# Patient Record
Sex: FEMALE | Race: WHITE | Employment: UNEMPLOYED | URBAN - NONMETROPOLITAN AREA
[De-identification: names, ages, dates, MRNs, and addresses within clinical notes are randomized per-mention and may not be internally consistent; named-entity substitution may affect disease eponyms.]

---

## 2017-08-22 ENCOUNTER — HOSPITAL ENCOUNTER (OUTPATIENT)
Age: 32
Discharge: HOME | End: 2017-08-22
Payer: COMMERCIAL

## 2017-08-22 LAB
BASOPHILS # BLD: 0.2 %
BASOPHILS ABSOLUTE: 0 THOU/MM3 (ref 0–0.1)
EOSINOPHIL # BLD: 1.9 %
EOSINOPHILS ABSOLUTE: 0.1 THOU/MM3 (ref 0–0.4)
HCT VFR BLD CALC: 41.6 % (ref 37–47)
HEMOGLOBIN: 14.1 GM/DL (ref 12–16)
LYMPHOCYTES # BLD: 36.2 %
LYMPHOCYTES ABSOLUTE: 2.8 THOU/MM3 (ref 1–4.8)
MCH RBC QN AUTO: 30.7 PG (ref 27–31)
MCHC RBC AUTO-ENTMCNC: 34 GM/DL (ref 33–37)
MCV RBC AUTO: 90.3 FL (ref 81–99)
MONOCYTES # BLD: 10 %
MONOCYTES ABSOLUTE: 0.8 THOU/MM3 (ref 0.4–1.3)
NUCLEATED RED BLOOD CELLS: 0 /100 WBC
PDW BLD-RTO: 13.1 % (ref 11.5–14.5)
PLATELET # BLD: 285 THOU/MM3 (ref 130–400)
PMV BLD AUTO: 7.6 MCM (ref 7.4–10.4)
RBC # BLD: 4.6 MILL/MM3 (ref 4.2–5.4)
RBC # BLD: NORMAL 10*6/UL
SEG NEUTROPHILS: 51.7 %
SEGMENTED NEUTROPHILS ABSOLUTE COUNT: 4 THOU/MM3 (ref 1.8–7.7)
WBC # BLD: 7.8 THOU/MM3 (ref 4.8–10.8)

## 2017-08-22 PROCEDURE — 36415 COLL VENOUS BLD VENIPUNCTURE: CPT

## 2017-08-22 PROCEDURE — 85025 COMPLETE CBC W/AUTO DIFF WBC: CPT

## 2017-10-09 NOTE — PROGRESS NOTES
NPO after midnight  [employer] and drivers license  Wear comfortable clean clothing  Do not bring jewelry or valuables  Shower night before and morning of surgery with a liquid antibacterial soap  Bring list of medications with dosage and how often taken  Follow all instructions given by your physician   needed at discharge

## 2017-10-10 ENCOUNTER — HOSPITAL ENCOUNTER (OUTPATIENT)
Age: 32
Setting detail: OUTPATIENT SURGERY
Discharge: HOME | End: 2017-10-10
Attending: OBSTETRICS & GYNECOLOGY | Admitting: OBSTETRICS & GYNECOLOGY
Payer: COMMERCIAL

## 2017-10-10 ENCOUNTER — ANESTHESIA (OUTPATIENT)
Dept: OPERATING ROOM | Age: 32
End: 2017-10-10
Payer: COMMERCIAL

## 2017-10-10 ENCOUNTER — ANESTHESIA EVENT (OUTPATIENT)
Dept: OPERATING ROOM | Age: 32
End: 2017-10-10
Payer: COMMERCIAL

## 2017-10-10 VITALS
SYSTOLIC BLOOD PRESSURE: 93 MMHG | DIASTOLIC BLOOD PRESSURE: 56 MMHG | HEART RATE: 82 BPM | HEIGHT: 60 IN | WEIGHT: 134 LBS | TEMPERATURE: 97.1 F | BODY MASS INDEX: 26.31 KG/M2 | OXYGEN SATURATION: 100 % | RESPIRATION RATE: 18 BRPM

## 2017-10-10 VITALS — TEMPERATURE: 98.6 F | SYSTOLIC BLOOD PRESSURE: 98 MMHG | DIASTOLIC BLOOD PRESSURE: 54 MMHG | OXYGEN SATURATION: 99 %

## 2017-10-10 PROBLEM — N80.9 ENDOMETRIOSIS DETERMINED BY LAPAROSCOPY: Status: ACTIVE | Noted: 2017-10-10

## 2017-10-10 PROBLEM — N73.6 PELVIC PERITONEAL ADHESIONS, FEMALE: Status: ACTIVE | Noted: 2017-10-10

## 2017-10-10 PROCEDURE — 2580000003 HC RX 258: Performed by: OBSTETRICS & GYNECOLOGY

## 2017-10-10 PROCEDURE — 2500000003 HC RX 250 WO HCPCS: Performed by: ANESTHESIOLOGY

## 2017-10-10 PROCEDURE — 3700000000 HC ANESTHESIA ATTENDED CARE: Performed by: OBSTETRICS & GYNECOLOGY

## 2017-10-10 PROCEDURE — 2500000003 HC RX 250 WO HCPCS: Performed by: NURSE ANESTHETIST, CERTIFIED REGISTERED

## 2017-10-10 PROCEDURE — 7100000001 HC PACU RECOVERY - ADDTL 15 MIN: Performed by: OBSTETRICS & GYNECOLOGY

## 2017-10-10 PROCEDURE — 7100000000 HC PACU RECOVERY - FIRST 15 MIN: Performed by: OBSTETRICS & GYNECOLOGY

## 2017-10-10 PROCEDURE — 6370000000 HC RX 637 (ALT 250 FOR IP): Performed by: OBSTETRICS & GYNECOLOGY

## 2017-10-10 PROCEDURE — 2720000010 HC SURG SUPPLY STERILE: Performed by: OBSTETRICS & GYNECOLOGY

## 2017-10-10 PROCEDURE — 3700000001 HC ADD 15 MINUTES (ANESTHESIA): Performed by: OBSTETRICS & GYNECOLOGY

## 2017-10-10 PROCEDURE — 7100000010 HC PHASE II RECOVERY - FIRST 15 MIN: Performed by: OBSTETRICS & GYNECOLOGY

## 2017-10-10 PROCEDURE — 6360000002 HC RX W HCPCS: Performed by: OBSTETRICS & GYNECOLOGY

## 2017-10-10 PROCEDURE — 2500000003 HC RX 250 WO HCPCS: Performed by: OBSTETRICS & GYNECOLOGY

## 2017-10-10 PROCEDURE — 2580000003 HC RX 258: Performed by: NURSE ANESTHETIST, CERTIFIED REGISTERED

## 2017-10-10 PROCEDURE — 3600000013 HC SURGERY LEVEL 3 ADDTL 15MIN: Performed by: OBSTETRICS & GYNECOLOGY

## 2017-10-10 PROCEDURE — C1758 CATHETER, URETERAL: HCPCS | Performed by: OBSTETRICS & GYNECOLOGY

## 2017-10-10 PROCEDURE — 7100000011 HC PHASE II RECOVERY - ADDTL 15 MIN: Performed by: OBSTETRICS & GYNECOLOGY

## 2017-10-10 PROCEDURE — 3600000003 HC SURGERY LEVEL 3 BASE: Performed by: OBSTETRICS & GYNECOLOGY

## 2017-10-10 PROCEDURE — 6360000002 HC RX W HCPCS: Performed by: NURSE ANESTHETIST, CERTIFIED REGISTERED

## 2017-10-10 RX ORDER — OXYCODONE HYDROCHLORIDE AND ACETAMINOPHEN 5; 325 MG/1; MG/1
2 TABLET ORAL EVERY 4 HOURS PRN
Status: DISCONTINUED | OUTPATIENT
Start: 2017-10-10 | End: 2017-10-10 | Stop reason: HOSPADM

## 2017-10-10 RX ORDER — KETOROLAC TROMETHAMINE 30 MG/ML
30 INJECTION, SOLUTION INTRAMUSCULAR; INTRAVENOUS EVERY 6 HOURS PRN
Status: DISCONTINUED | OUTPATIENT
Start: 2017-10-10 | End: 2017-10-10 | Stop reason: HOSPADM

## 2017-10-10 RX ORDER — SODIUM CHLORIDE 9 MG/ML
INJECTION, SOLUTION INTRAVENOUS CONTINUOUS
Status: DISCONTINUED | OUTPATIENT
Start: 2017-10-10 | End: 2017-10-10 | Stop reason: HOSPADM

## 2017-10-10 RX ORDER — PROPOFOL 10 MG/ML
INJECTION, EMULSION INTRAVENOUS PRN
Status: DISCONTINUED | OUTPATIENT
Start: 2017-10-10 | End: 2017-10-10 | Stop reason: SDUPTHER

## 2017-10-10 RX ORDER — MORPHINE SULFATE 2 MG/ML
2 INJECTION, SOLUTION INTRAMUSCULAR; INTRAVENOUS EVERY 5 MIN PRN
Status: DISCONTINUED | OUTPATIENT
Start: 2017-10-10 | End: 2017-10-10 | Stop reason: HOSPADM

## 2017-10-10 RX ORDER — FENTANYL CITRATE 50 UG/ML
50 INJECTION, SOLUTION INTRAMUSCULAR; INTRAVENOUS EVERY 5 MIN PRN
Status: DISCONTINUED | OUTPATIENT
Start: 2017-10-10 | End: 2017-10-10 | Stop reason: HOSPADM

## 2017-10-10 RX ORDER — SODIUM CHLORIDE 0.9 % (FLUSH) 0.9 %
10 SYRINGE (ML) INJECTION PRN
Status: DISCONTINUED | OUTPATIENT
Start: 2017-10-10 | End: 2017-10-10 | Stop reason: HOSPADM

## 2017-10-10 RX ORDER — SODIUM CHLORIDE 0.9 % (FLUSH) 0.9 %
10 SYRINGE (ML) INJECTION EVERY 12 HOURS SCHEDULED
Status: DISCONTINUED | OUTPATIENT
Start: 2017-10-10 | End: 2017-10-10 | Stop reason: HOSPADM

## 2017-10-10 RX ORDER — BUPIVACAINE HYDROCHLORIDE 5 MG/ML
INJECTION, SOLUTION PERINEURAL PRN
Status: DISCONTINUED | OUTPATIENT
Start: 2017-10-10 | End: 2017-10-10 | Stop reason: HOSPADM

## 2017-10-10 RX ORDER — GLYCOPYRROLATE 0.2 MG/ML
0.2 INJECTION INTRAMUSCULAR; INTRAVENOUS ONCE
Status: COMPLETED | OUTPATIENT
Start: 2017-10-10 | End: 2017-10-10

## 2017-10-10 RX ORDER — ONDANSETRON 2 MG/ML
4 INJECTION INTRAMUSCULAR; INTRAVENOUS EVERY 6 HOURS PRN
Status: DISCONTINUED | OUTPATIENT
Start: 2017-10-10 | End: 2017-10-10 | Stop reason: HOSPADM

## 2017-10-10 RX ORDER — ACETAMINOPHEN 325 MG/1
650 TABLET ORAL EVERY 4 HOURS PRN
Status: DISCONTINUED | OUTPATIENT
Start: 2017-10-10 | End: 2017-10-10 | Stop reason: HOSPADM

## 2017-10-10 RX ORDER — DOCUSATE SODIUM 100 MG/1
100 CAPSULE, LIQUID FILLED ORAL 2 TIMES DAILY
Status: DISCONTINUED | OUTPATIENT
Start: 2017-10-10 | End: 2017-10-10 | Stop reason: HOSPADM

## 2017-10-10 RX ORDER — LIDOCAINE HYDROCHLORIDE 20 MG/ML
INJECTION, SOLUTION INFILTRATION; PERINEURAL PRN
Status: DISCONTINUED | OUTPATIENT
Start: 2017-10-10 | End: 2017-10-10 | Stop reason: SDUPTHER

## 2017-10-10 RX ORDER — LABETALOL HYDROCHLORIDE 5 MG/ML
10 INJECTION, SOLUTION INTRAVENOUS EVERY 10 MIN PRN
Status: DISCONTINUED | OUTPATIENT
Start: 2017-10-10 | End: 2017-10-10 | Stop reason: HOSPADM

## 2017-10-10 RX ORDER — OXYCODONE HYDROCHLORIDE AND ACETAMINOPHEN 5; 325 MG/1; MG/1
1 TABLET ORAL EVERY 4 HOURS PRN
Status: DISCONTINUED | OUTPATIENT
Start: 2017-10-10 | End: 2017-10-10 | Stop reason: HOSPADM

## 2017-10-10 RX ORDER — DEXAMETHASONE SODIUM PHOSPHATE 4 MG/ML
INJECTION, SOLUTION INTRA-ARTICULAR; INTRALESIONAL; INTRAMUSCULAR; INTRAVENOUS; SOFT TISSUE PRN
Status: DISCONTINUED | OUTPATIENT
Start: 2017-10-10 | End: 2017-10-10 | Stop reason: SDUPTHER

## 2017-10-10 RX ORDER — NEOSTIGMINE METHYLSULFATE 1 MG/ML
INJECTION, SOLUTION INTRAVENOUS PRN
Status: DISCONTINUED | OUTPATIENT
Start: 2017-10-10 | End: 2017-10-10 | Stop reason: SDUPTHER

## 2017-10-10 RX ORDER — OXYCODONE HYDROCHLORIDE AND ACETAMINOPHEN 5; 325 MG/1; MG/1
1-2 TABLET ORAL EVERY 4 HOURS PRN
Qty: 28 TABLET | Refills: 0 | Status: SHIPPED | OUTPATIENT
Start: 2017-10-10 | End: 2017-10-17

## 2017-10-10 RX ORDER — GLYCOPYRROLATE 0.2 MG/ML
INJECTION INTRAMUSCULAR; INTRAVENOUS PRN
Status: DISCONTINUED | OUTPATIENT
Start: 2017-10-10 | End: 2017-10-10 | Stop reason: SDUPTHER

## 2017-10-10 RX ORDER — KETOROLAC TROMETHAMINE 30 MG/ML
INJECTION, SOLUTION INTRAMUSCULAR; INTRAVENOUS PRN
Status: DISCONTINUED | OUTPATIENT
Start: 2017-10-10 | End: 2017-10-10 | Stop reason: SDUPTHER

## 2017-10-10 RX ORDER — FENTANYL CITRATE 50 UG/ML
INJECTION, SOLUTION INTRAMUSCULAR; INTRAVENOUS PRN
Status: DISCONTINUED | OUTPATIENT
Start: 2017-10-10 | End: 2017-10-10 | Stop reason: SDUPTHER

## 2017-10-10 RX ORDER — ONDANSETRON 2 MG/ML
INJECTION INTRAMUSCULAR; INTRAVENOUS PRN
Status: DISCONTINUED | OUTPATIENT
Start: 2017-10-10 | End: 2017-10-10 | Stop reason: SDUPTHER

## 2017-10-10 RX ORDER — SODIUM CHLORIDE 9 MG/ML
INJECTION, SOLUTION INTRAVENOUS CONTINUOUS PRN
Status: DISCONTINUED | OUTPATIENT
Start: 2017-10-10 | End: 2017-10-10 | Stop reason: SDUPTHER

## 2017-10-10 RX ORDER — ROCURONIUM BROMIDE 10 MG/ML
INJECTION, SOLUTION INTRAVENOUS PRN
Status: DISCONTINUED | OUTPATIENT
Start: 2017-10-10 | End: 2017-10-10 | Stop reason: SDUPTHER

## 2017-10-10 RX ADMIN — LIDOCAINE HYDROCHLORIDE 60 MG: 20 INJECTION, SOLUTION INFILTRATION; PERINEURAL at 09:34

## 2017-10-10 RX ADMIN — FENTANYL CITRATE 100 MCG: 50 INJECTION INTRAMUSCULAR; INTRAVENOUS at 09:34

## 2017-10-10 RX ADMIN — ONDANSETRON 4 MG: 2 INJECTION INTRAMUSCULAR; INTRAVENOUS at 09:37

## 2017-10-10 RX ADMIN — GLYCOPYRROLATE 0.2 MG: 0.2 INJECTION, SOLUTION INTRAMUSCULAR; INTRAVENOUS at 10:40

## 2017-10-10 RX ADMIN — SODIUM CHLORIDE: 9 INJECTION, SOLUTION INTRAVENOUS at 10:37

## 2017-10-10 RX ADMIN — DEXAMETHASONE SODIUM PHOSPHATE 8 MG: 4 INJECTION, SOLUTION INTRAMUSCULAR; INTRAVENOUS at 09:37

## 2017-10-10 RX ADMIN — OXYCODONE HYDROCHLORIDE AND ACETAMINOPHEN 2 TABLET: 5; 325 TABLET ORAL at 11:11

## 2017-10-10 RX ADMIN — KETOROLAC TROMETHAMINE 30 MG: 30 INJECTION, SOLUTION INTRAMUSCULAR; INTRAVENOUS at 10:01

## 2017-10-10 RX ADMIN — Medication 30 MG: at 09:34

## 2017-10-10 RX ADMIN — ONDANSETRON 4 MG: 2 INJECTION INTRAMUSCULAR; INTRAVENOUS at 10:14

## 2017-10-10 RX ADMIN — SODIUM CHLORIDE: 9 INJECTION, SOLUTION INTRAVENOUS at 09:32

## 2017-10-10 RX ADMIN — PROPOFOL 150 MG: 10 INJECTION, EMULSION INTRAVENOUS at 09:34

## 2017-10-10 RX ADMIN — NEOSTIGMINE METHYLSULFATE 3 MG: 1 INJECTION, SOLUTION INTRAVENOUS at 10:01

## 2017-10-10 RX ADMIN — GLYCOPYRROLATE 0.4 MG: 0.2 INJECTION, SOLUTION INTRAMUSCULAR; INTRAVENOUS at 10:01

## 2017-10-10 ASSESSMENT — PAIN SCALES - GENERAL
PAINLEVEL_OUTOF10: 7
PAINLEVEL_OUTOF10: 7

## 2017-10-10 ASSESSMENT — PULMONARY FUNCTION TESTS
PIF_VALUE: 13
PIF_VALUE: 5
PIF_VALUE: 16
PIF_VALUE: 13
PIF_VALUE: 17
PIF_VALUE: 1
PIF_VALUE: 14
PIF_VALUE: 22
PIF_VALUE: 14
PIF_VALUE: 23
PIF_VALUE: 16
PIF_VALUE: 23
PIF_VALUE: 23
PIF_VALUE: 13
PIF_VALUE: 1
PIF_VALUE: 22
PIF_VALUE: 21
PIF_VALUE: 13
PIF_VALUE: 14
PIF_VALUE: 13
PIF_VALUE: 13
PIF_VALUE: 23
PIF_VALUE: 14
PIF_VALUE: 16
PIF_VALUE: 23
PIF_VALUE: 21
PIF_VALUE: 13
PIF_VALUE: 24
PIF_VALUE: 14
PIF_VALUE: 22
PIF_VALUE: 13

## 2017-10-10 ASSESSMENT — PAIN DESCRIPTION - LOCATION: LOCATION: ABDOMEN

## 2017-10-10 ASSESSMENT — PAIN DESCRIPTION - ORIENTATION: ORIENTATION: MID

## 2017-10-10 ASSESSMENT — PAIN DESCRIPTION - PAIN TYPE: TYPE: SURGICAL PAIN

## 2017-10-10 ASSESSMENT — PAIN - FUNCTIONAL ASSESSMENT: PAIN_FUNCTIONAL_ASSESSMENT: 0-10

## 2017-10-10 NOTE — PROGRESS NOTES
APPROXIMATELY 75ML OF CLEAR, YELLOW URINE DRAINED FROM THE BLADDER WITH A 14FR RED RUBBER STRAIGHT CATHETER BY Garima Saldana RN

## 2017-10-10 NOTE — ANESTHESIA PRE PROCEDURE
BP Readings from Last 3 Encounters:   10/10/17 109/77   03/06/17 135/78   03/08/16 100/56       NPO Status: Time of last liquid consumption: 1930                        Time of last solid consumption: 1930                        Date of last liquid consumption: 10/09/17                        Date of last solid food consumption: 10/09/17    BMI:   Wt Readings from Last 3 Encounters:   10/10/17 134 lb (60.8 kg)   03/06/17 133 lb (60.3 kg)   03/08/16 135 lb (61.2 kg)     Body mass index is 26.17 kg/m². CBC:   Lab Results   Component Value Date    WBC 7.8 08/22/2017    RBC 4.60 08/22/2017    RBC 4.40 12/30/2011    HGB 14.1 08/22/2017    HCT 41.6 08/22/2017    MCV 90.3 08/22/2017    RDW 13.1 08/22/2017     08/22/2017       CMP:   Lab Results   Component Value Date     03/06/2017    K 3.8 03/06/2017     03/06/2017    CO2 22 03/06/2017    BUN 10 03/06/2017    CREATININE 0.7 03/06/2017    LABGLOM >90 03/06/2017    GLUCOSE 91 03/06/2017    PROT 8.1 12/25/2014    CALCIUM 9.4 03/06/2017    BILITOT 0.2 12/25/2014    ALKPHOS 61 12/25/2014    AST 16 12/25/2014    ALT 11 12/25/2014       POC Tests: No results for input(s): POCGLU, POCNA, POCK, POCCL, POCBUN, POCHEMO, POCHCT in the last 72 hours.     Coags: No results found for: PROTIME, INR, APTT    HCG (If Applicable):   Lab Results   Component Value Date    PREGTESTUR NEGATIVE 03/06/2017        ABGs: No results found for: PHART, PO2ART, ZSF8CLI, NBZ1RIU, BEART, T9QGPYUH     Type & Screen (If Applicable):  No results found for: LABABO, LABRH    Anesthesia Evaluation    Airway: Mallampati: II  TM distance: >3 FB   Neck ROM: full  Mouth opening: > = 3 FB Dental:          Pulmonary:   (+) decreased breath sounds,  asthma:                            Cardiovascular:            Rhythm: regular                      Neuro/Psych:               GI/Hepatic/Renal:             Endo/Other:                     Abdominal:           Vascular:

## 2017-10-10 NOTE — PROGRESS NOTES
1008: Patient to recovery room via cart. Patient is arouses easily to name. Patient placed on monitor and vitals obtained, see charting. Report received from [facility] and [employer]. Band aids and devi pad is in place, see LDA. Patient is on room air. Patient stating she is nauseous at this time. 1014: PRN Zofran given, see MAR. Ice pack applied to patient's abdomen. 1020: Patient stating she is having pain in her abdomen. Due to the patient's continued low blood pressure and lower heart rate I do not feel comfortable medicating the patient at this time. 1026: Patient stating her nausea is better. Blood pressure continues to be low, fluids are infusing. 1038: Dr. Kendra Melton notified of patient's heart rate maintaining in the mid-lower 40's, see order. 1040: 0.2mg of Robinul given, see MAR.   1046: Patient's vital signs are stable, see charting. 1055: Patient rating pain a \"7\"/10 and denying nausea. I told patient once she gets something to eat I would get her some pain pills, she verbalized understanding. Vital signs are stable, see charting. 1100: Patient meets criteria to be discharged from phase 1 to phase 2. Patient moved to phase 2 at this time. 1104: Patient given offered snack and drink. Bed is in the lowest position, call light is within reach and patient's  is at the bedside. 1111: Patient given PRN pain medication for a pain level of \"7\"/10 and denying nausea, see MAR.  remains at the bedside. 1131: Patient rating pain a \"2\"/10 and denying nausea. Vital signs rechecked, see charting. Fluids hooked back up due to a lower blood pressure. Patient denying feeling dizzy. 1139: Vitals rechecked, see charting. 1141: IV removed at this time, no complications. 1145: Discharge instructions given and explained to the patient and the patient's , both verbalized understanding. 1155: Patient discharged home in stable condition with her .

## 2017-10-10 NOTE — ANESTHESIA POSTPROCEDURE EVALUATION
Department of Anesthesiology  Postprocedure Note    Patient: Erin Lester  MRN: [id number]  YOB: 1985  Date of evaluation: 10/10/2017  Time:  11:19 AM     Procedure Summary     Date:  10/10/17 Room / Location:  [facility] 02 / [address]    Anesthesia Start:  0932 Anesthesia Stop:  1010    Procedure:  DIAGNOSTIC LAPAROSCOPY WITH CAUTERY OF ENDOMETRIOSIS AND LYSIS OF ADHESIONS (N/A ) Diagnosis:  (PELVIC PAIN)    Surgeon:  Angela Fajardo MD Responsible Provider:  Harshad Ozuna MD    Anesthesia Type:  general ASA Status:  Not recorded          Anesthesia Type: general    Scottie Phase I: Scottie Score: 9    Scottie Phase II: Scottie Score: 10    Last vitals: Reviewed and per EMR flowsheets.        Anesthesia Post Evaluation    Patient location during evaluation: PACU  Patient participation: complete - patient participated  Level of consciousness: awake  Pain score: 4  Airway patency: patent  Nausea & Vomiting: no vomiting and no nausea  Complications: no  Cardiovascular status: hemodynamically stable  Respiratory status: acceptable  Hydration status: stable

## 2018-02-01 ENCOUNTER — HOSPITAL ENCOUNTER (EMERGENCY)
Age: 33
Discharge: HOME | End: 2018-02-01
Payer: COMMERCIAL

## 2018-02-01 VITALS
WEIGHT: 135 LBS | TEMPERATURE: 99 F | DIASTOLIC BLOOD PRESSURE: 69 MMHG | OXYGEN SATURATION: 98 % | HEART RATE: 79 BPM | BODY MASS INDEX: 26.37 KG/M2 | RESPIRATION RATE: 18 BRPM | SYSTOLIC BLOOD PRESSURE: 119 MMHG

## 2018-02-01 DIAGNOSIS — J02.9 PHARYNGITIS, UNSPECIFIED ETIOLOGY: Primary | ICD-10-CM

## 2018-02-01 LAB
GROUP A STREP CULTURE, REFLEX: NEGATIVE
REFLEX THROAT C + S: NORMAL

## 2018-02-01 PROCEDURE — 87070 CULTURE OTHR SPECIMN AEROBIC: CPT

## 2018-02-01 PROCEDURE — 87880 STREP A ASSAY W/OPTIC: CPT

## 2018-02-01 PROCEDURE — 99214 OFFICE O/P EST MOD 30 MIN: CPT

## 2018-02-01 PROCEDURE — 99213 OFFICE O/P EST LOW 20 MIN: CPT | Performed by: NURSE PRACTITIONER

## 2018-02-01 RX ORDER — BROMPHENIRAMINE MALEATE, PSEUDOEPHEDRINE HYDROCHLORIDE, AND DEXTROMETHORPHAN HYDROBROMIDE 2; 30; 10 MG/5ML; MG/5ML; MG/5ML
5 SYRUP ORAL 4 TIMES DAILY PRN
Qty: 1 BOTTLE | Refills: 0 | Status: SHIPPED | OUTPATIENT
Start: 2018-02-01 | End: 2018-06-01 | Stop reason: ALTCHOICE

## 2018-02-01 ASSESSMENT — ENCOUNTER SYMPTOMS
SINUS PAIN: 0
WHEEZING: 0
VOMITING: 0
DIARRHEA: 0
CHEST TIGHTNESS: 0
SORE THROAT: 1
SINUS PRESSURE: 0
EYE ITCHING: 0
RHINORRHEA: 0
COUGH: 0
TROUBLE SWALLOWING: 0
NAUSEA: 0
SHORTNESS OF BREATH: 0
EYE DISCHARGE: 0
COLOR CHANGE: 0

## 2018-02-01 ASSESSMENT — PAIN DESCRIPTION - PAIN TYPE: TYPE: ACUTE PAIN

## 2018-02-01 ASSESSMENT — PAIN DESCRIPTION - LOCATION: LOCATION: THROAT

## 2018-02-01 ASSESSMENT — PAIN SCALES - GENERAL: PAINLEVEL_OUTOF10: 4

## 2018-02-01 ASSESSMENT — PAIN DESCRIPTION - DESCRIPTORS: DESCRIPTORS: ACHING

## 2018-02-01 NOTE — PROGRESS NOTES
All discharge education and information given. Pt instructed to go to ED for any shortness of breath, chest pain or Abd pain. Verbalized Understanding. Left stable.

## 2018-02-01 NOTE — ED TRIAGE NOTES
ambulatory back to exam room. Patient complains of sore throat. Symptoms began 2 days ago. Respirations easy and unlabored. Condition stable. Waiting in room to be seen by medical provider.

## 2018-02-03 LAB — THROAT/NOSE CULTURE: NORMAL

## 2018-06-01 ENCOUNTER — HOSPITAL ENCOUNTER (EMERGENCY)
Age: 33
Discharge: HOME | End: 2018-06-01
Payer: COMMERCIAL

## 2018-06-01 VITALS
TEMPERATURE: 98.4 F | DIASTOLIC BLOOD PRESSURE: 62 MMHG | HEART RATE: 70 BPM | WEIGHT: 138 LBS | RESPIRATION RATE: 16 BRPM | OXYGEN SATURATION: 98 % | SYSTOLIC BLOOD PRESSURE: 103 MMHG | BODY MASS INDEX: 26.95 KG/M2

## 2018-06-01 DIAGNOSIS — J40 BRONCHITIS: Primary | ICD-10-CM

## 2018-06-01 PROCEDURE — 99213 OFFICE O/P EST LOW 20 MIN: CPT

## 2018-06-01 PROCEDURE — 99213 OFFICE O/P EST LOW 20 MIN: CPT | Performed by: NURSE PRACTITIONER

## 2018-06-01 RX ORDER — GUAIFENESIN AND CODEINE PHOSPHATE 100; 10 MG/5ML; MG/5ML
5 SOLUTION ORAL 3 TIMES DAILY PRN
Qty: 100 ML | Refills: 0 | Status: SHIPPED | OUTPATIENT
Start: 2018-06-01 | End: 2018-06-08

## 2018-06-01 RX ORDER — BENZONATATE 100 MG/1
100 CAPSULE ORAL 3 TIMES DAILY PRN
Qty: 15 CAPSULE | Refills: 0 | Status: SHIPPED | OUTPATIENT
Start: 2018-06-01 | End: 2018-06-06

## 2018-06-01 RX ORDER — AZITHROMYCIN 250 MG/1
TABLET, FILM COATED ORAL
Qty: 6 TABLET | Refills: 0 | Status: SHIPPED | OUTPATIENT
Start: 2018-06-01 | End: 2019-01-05

## 2018-06-01 RX ORDER — GUAIFENESIN 100 MG/5ML
200 SOLUTION ORAL EVERY 4 HOURS PRN
COMMUNITY
End: 2018-06-01

## 2018-06-01 ASSESSMENT — ENCOUNTER SYMPTOMS
SHORTNESS OF BREATH: 0
SINUS PRESSURE: 0
CHEST TIGHTNESS: 0
NAUSEA: 0
SORE THROAT: 0
ABDOMINAL PAIN: 0
COUGH: 1
SINUS PAIN: 0
RHINORRHEA: 1
VOMITING: 0
DIARRHEA: 0

## 2018-07-27 ENCOUNTER — OFFICE VISIT (OUTPATIENT)
Dept: FAMILY MEDICINE CLINIC | Age: 33
End: 2018-07-27
Payer: COMMERCIAL

## 2018-07-27 VITALS
HEART RATE: 68 BPM | OXYGEN SATURATION: 99 % | WEIGHT: 134.4 LBS | HEIGHT: 63 IN | BODY MASS INDEX: 23.81 KG/M2 | DIASTOLIC BLOOD PRESSURE: 62 MMHG | SYSTOLIC BLOOD PRESSURE: 102 MMHG | RESPIRATION RATE: 18 BRPM

## 2018-07-27 DIAGNOSIS — E16.1 REACTIVE HYPOGLYCEMIA: Primary | ICD-10-CM

## 2018-07-27 DIAGNOSIS — Z72.0 TOBACCO ABUSE: ICD-10-CM

## 2018-07-27 DIAGNOSIS — J30.1 CHRONIC SEASONAL ALLERGIC RHINITIS DUE TO POLLEN: ICD-10-CM

## 2018-07-27 PROCEDURE — G8427 DOCREV CUR MEDS BY ELIG CLIN: HCPCS | Performed by: NURSE PRACTITIONER

## 2018-07-27 PROCEDURE — G8420 CALC BMI NORM PARAMETERS: HCPCS | Performed by: NURSE PRACTITIONER

## 2018-07-27 PROCEDURE — 4004F PT TOBACCO SCREEN RCVD TLK: CPT | Performed by: NURSE PRACTITIONER

## 2018-07-27 PROCEDURE — 99204 OFFICE O/P NEW MOD 45 MIN: CPT | Performed by: NURSE PRACTITIONER

## 2018-07-27 RX ORDER — CETIRIZINE HYDROCHLORIDE 10 MG/1
10 TABLET ORAL DAILY
Qty: 30 TABLET | Refills: 5 | Status: SHIPPED | OUTPATIENT
Start: 2018-07-27 | End: 2019-01-05

## 2018-07-27 RX ORDER — VARENICLINE TARTRATE 25 MG
KIT ORAL
Qty: 1 EACH | Refills: 0 | Status: SHIPPED | OUTPATIENT
Start: 2018-07-27 | End: 2019-01-05

## 2018-07-27 RX ORDER — VARENICLINE TARTRATE 1 MG/1
1 TABLET, FILM COATED ORAL 2 TIMES DAILY
Qty: 60 TABLET | Refills: 3 | Status: SHIPPED | OUTPATIENT
Start: 2018-07-27 | End: 2019-01-05

## 2018-07-27 RX ORDER — MELATONIN 10 MG
1 CAPSULE ORAL NIGHTLY
Qty: 30 CAPSULE | Refills: 11 | Status: SHIPPED | OUTPATIENT
Start: 2018-07-27 | End: 2020-03-06

## 2018-07-27 ASSESSMENT — ENCOUNTER SYMPTOMS
DIARRHEA: 0
ABDOMINAL PAIN: 0
EYE REDNESS: 0
CONSTIPATION: 0
COLOR CHANGE: 0
COUGH: 1
EYE ITCHING: 0
EYE PAIN: 0
SORE THROAT: 0
SINUS PRESSURE: 0
SHORTNESS OF BREATH: 0
RHINORRHEA: 0
EYE DISCHARGE: 0
TROUBLE SWALLOWING: 0
CHEST TIGHTNESS: 0
SINUS PAIN: 0
BACK PAIN: 0
PHOTOPHOBIA: 0
ABDOMINAL DISTENTION: 1
WHEEZING: 0
NAUSEA: 0
BLOOD IN STOOL: 0
VOMITING: 0

## 2018-07-27 ASSESSMENT — PATIENT HEALTH QUESTIONNAIRE - PHQ9
SUM OF ALL RESPONSES TO PHQ QUESTIONS 1-9: 0
1. LITTLE INTEREST OR PLEASURE IN DOING THINGS: 0
SUM OF ALL RESPONSES TO PHQ9 QUESTIONS 1 & 2: 0
2. FEELING DOWN, DEPRESSED OR HOPELESS: 0

## 2018-07-27 NOTE — PROGRESS NOTES
[facility]  [facility]  [address] [place] [address]  Dept: [phone]  Dept Fax: : [phone]     Visit Date:  7/27/2018      Patient:  José Manuel Mcfarland  YOB: 1985    HPI:     Chief Complaint   Patient presents with    Other     wants to quit smoking       New patient. Wants to quit smoking. Would like chantix. Gets shaky if she doesn't eat every 3 hours. Talked about no unopposed carbs. Medications    Current Outpatient Prescriptions:     varenicline (CHANTIX STARTING MONTH PAK) 0.5 MG X 11 & 1 MG X 42 tablet, Take by mouth., Disp: 1 each, Rfl: 0    varenicline (CHANTIX) 1 MG tablet, Take 1 tablet by mouth 2 times daily, Disp: 60 tablet, Rfl: 3    cetirizine (ZYRTEC ALLERGY) 10 MG tablet, Take 1 tablet by mouth daily, Disp: 30 tablet, Rfl: 5    Melatonin 10 MG CAPS, Take 1 capsule by mouth nightly, Disp: 30 capsule, Rfl: 11    azithromycin (ZITHROMAX) 250 MG tablet, Take 2 tablets by mouth on day one, then take one tablet by mouth on days 2 through 5., Disp: 6 tablet, Rfl: 0    The patient is allergic to pcn [penicillins]. Past Medical History  Carol Grant  has a past medical history of Asthma; Endometriosis; PID (acute pelvic inflammatory disease); and Placenta previa. Subjective:      Review of Systems   Constitutional: Positive for fatigue and unexpected weight change (gained weight since she turned 30). Negative for activity change, appetite change and fever. HENT: Positive for congestion (allergies) and postnasal drip. Negative for dental problem, ear pain, hearing loss, mouth sores, rhinorrhea, sinus pain, sinus pressure, sore throat and trouble swallowing. Eyes: Negative for photophobia, pain, discharge, redness, itching and visual disturbance (blurred vision when she is tired). Respiratory: Positive for cough (smokers cough. smokes less than 1ppd).  Negative for chest tightness, shortness of breath and wheezing. Cardiovascular: Negative for chest pain, palpitations and leg swelling. Gastrointestinal: Positive for abdominal distention (lots of gas). Negative for abdominal pain, blood in stool, constipation, diarrhea, nausea and vomiting. Endocrine: Negative for cold intolerance, heat intolerance, polydipsia, polyphagia and polyuria. Genitourinary: Positive for dysuria. Negative for difficulty urinating, frequency and hematuria. Musculoskeletal: Negative for arthralgias, back pain, gait problem, joint swelling, myalgias, neck pain and neck stiffness. Skin: Negative for color change, pallor, rash and wound. Allergic/Immunologic: Negative for environmental allergies and food allergies. Neurological: Positive for light-headedness (when she gets shaky). Negative for dizziness, tremors, seizures, speech difficulty, weakness, numbness and headaches. Hematological: Negative for adenopathy. Does not bruise/bleed easily. Psychiatric/Behavioral: Positive for sleep disturbance (trouble falling asleep off and on). Negative for behavioral problems, confusion and decreased concentration. The patient is not hyperactive. Objective:     /62   Pulse 68   Resp 18   Ht 5' 2.5\" (1.588 m)   Wt 134 lb 6.4 oz (61 kg)   SpO2 99%   BMI 24.19 kg/m²     Physical Exam   Constitutional: She is oriented to person, place, and time. She appears well-developed and well-nourished. HENT:   Head: Normocephalic and atraumatic. Right Ear: External ear normal.   Left Ear: External ear normal.   Nose: Nose normal.   Mouth/Throat: Oropharynx is clear and moist.   Eyes: Conjunctivae and EOM are normal. Pupils are equal, round, and reactive to light. Neck: Normal range of motion. Neck supple. No tracheal deviation present. No thyromegaly present. Cardiovascular: Normal rate, regular rhythm, normal heart sounds and intact distal pulses. No murmur heard.   Pulmonary/Chest: Effort normal and breath sounds normal.

## 2019-01-05 ENCOUNTER — HOSPITAL ENCOUNTER (EMERGENCY)
Age: 34
Discharge: HOME | End: 2019-01-05
Payer: COMMERCIAL

## 2019-01-05 VITALS
SYSTOLIC BLOOD PRESSURE: 101 MMHG | DIASTOLIC BLOOD PRESSURE: 71 MMHG | BODY MASS INDEX: 24.95 KG/M2 | TEMPERATURE: 98.5 F | HEART RATE: 86 BPM | WEIGHT: 138.6 LBS | OXYGEN SATURATION: 99 % | RESPIRATION RATE: 16 BRPM

## 2019-01-05 DIAGNOSIS — J02.0 ACUTE STREPTOCOCCAL PHARYNGITIS: Primary | ICD-10-CM

## 2019-01-05 PROCEDURE — 99213 OFFICE O/P EST LOW 20 MIN: CPT | Performed by: NURSE PRACTITIONER

## 2019-01-05 PROCEDURE — 99212 OFFICE O/P EST SF 10 MIN: CPT

## 2019-01-05 RX ORDER — AZITHROMYCIN 250 MG/1
TABLET, FILM COATED ORAL
Qty: 6 TABLET | Refills: 0 | Status: SHIPPED | OUTPATIENT
Start: 2019-01-05 | End: 2019-05-20

## 2019-01-05 RX ORDER — SAW/VIT E/SOD SEL/LYC/BETA/PYG 160-100
1 TABLET ORAL DAILY
Qty: 30 CAPSULE | Refills: 0 | Status: SHIPPED | OUTPATIENT
Start: 2019-01-05 | End: 2019-02-04

## 2019-01-05 ASSESSMENT — PAIN DESCRIPTION - LOCATION: LOCATION: THROAT

## 2019-01-05 ASSESSMENT — ENCOUNTER SYMPTOMS
RHINORRHEA: 0
TROUBLE SWALLOWING: 0
EYE DISCHARGE: 0
SHORTNESS OF BREATH: 0
ABDOMINAL PAIN: 1
CHOKING: 0
VOICE CHANGE: 0
STRIDOR: 0
WHEEZING: 0
COUGH: 0
SINUS CONGESTION: 0
APNEA: 0
CHEST TIGHTNESS: 0

## 2019-01-05 ASSESSMENT — PAIN DESCRIPTION - FREQUENCY: FREQUENCY: CONTINUOUS

## 2019-01-05 ASSESSMENT — PAIN DESCRIPTION - DESCRIPTORS: DESCRIPTORS: SORE

## 2019-01-05 ASSESSMENT — PAIN DESCRIPTION - PAIN TYPE: TYPE: ACUTE PAIN

## 2019-01-05 ASSESSMENT — PAIN SCALES - GENERAL: PAINLEVEL_OUTOF10: 3

## 2019-01-07 ENCOUNTER — APPOINTMENT (OUTPATIENT)
Dept: GENERAL RADIOLOGY | Age: 34
End: 2019-01-07
Payer: COMMERCIAL

## 2019-01-07 ENCOUNTER — HOSPITAL ENCOUNTER (EMERGENCY)
Age: 34
Discharge: HOME | End: 2019-01-07
Attending: FAMILY MEDICINE
Payer: COMMERCIAL

## 2019-01-07 ENCOUNTER — APPOINTMENT (OUTPATIENT)
Dept: CT IMAGING | Age: 34
End: 2019-01-07
Payer: COMMERCIAL

## 2019-01-07 VITALS
HEART RATE: 93 BPM | DIASTOLIC BLOOD PRESSURE: 76 MMHG | SYSTOLIC BLOOD PRESSURE: 126 MMHG | OXYGEN SATURATION: 99 % | RESPIRATION RATE: 16 BRPM | WEIGHT: 135 LBS | TEMPERATURE: 98.1 F | BODY MASS INDEX: 26.5 KG/M2 | HEIGHT: 60 IN

## 2019-01-07 DIAGNOSIS — F41.1 ANXIETY STATE: ICD-10-CM

## 2019-01-07 DIAGNOSIS — R07.89 OTHER CHEST PAIN: Primary | ICD-10-CM

## 2019-01-07 LAB
AMPHETAMINE+METHAMPHETAMINE URINE SCREEN: NEGATIVE
ANION GAP SERPL CALCULATED.3IONS-SCNC: 15 MEQ/L (ref 8–16)
BARBITURATE QUANTITATIVE URINE: NEGATIVE
BASOPHILS # BLD: 0.2 %
BASOPHILS ABSOLUTE: 0 THOU/MM3 (ref 0–0.1)
BENZODIAZEPINE QUANTITATIVE URINE: NEGATIVE
BUN BLDV-MCNC: 9 MG/DL (ref 7–22)
CALCIUM SERPL-MCNC: 9.1 MG/DL (ref 8.5–10.5)
CANNABINOID QUANTITATIVE URINE: NEGATIVE
CHLORIDE BLD-SCNC: 110 MEQ/L (ref 98–111)
CO2: 19 MEQ/L (ref 23–33)
COCAINE METABOLITE QUANTITATIVE URINE: NEGATIVE
CREAT SERPL-MCNC: 0.7 MG/DL (ref 0.4–1.2)
D-DIMER QUANTITATIVE: 534 NG/ML FEU (ref 0–500)
EKG ATRIAL RATE: 86 BPM
EKG P AXIS: 85 DEGREES
EKG P-R INTERVAL: 150 MS
EKG Q-T INTERVAL: 370 MS
EKG QRS DURATION: 76 MS
EKG QTC CALCULATION (BAZETT): 442 MS
EKG R AXIS: 72 DEGREES
EKG T AXIS: 59 DEGREES
EKG VENTRICULAR RATE: 86 BPM
EOSINOPHIL # BLD: 2.6 %
EOSINOPHILS ABSOLUTE: 0.2 THOU/MM3 (ref 0–0.4)
ERYTHROCYTE [DISTWIDTH] IN BLOOD BY AUTOMATED COUNT: 12.9 % (ref 11.5–14.5)
ERYTHROCYTE [DISTWIDTH] IN BLOOD BY AUTOMATED COUNT: 42 FL (ref 35–45)
GFR SERPL CREATININE-BSD FRML MDRD: > 90 ML/MIN/1.73M2
GLUCOSE BLD-MCNC: 105 MG/DL (ref 70–108)
HCT VFR BLD CALC: 40.4 % (ref 37–47)
HEMOGLOBIN: 13.6 GM/DL (ref 12–16)
IMMATURE GRANS (ABS): 0.02 THOU/MM3 (ref 0–0.07)
IMMATURE GRANULOCYTES: 0.2 %
LYMPHOCYTES # BLD: 34 %
LYMPHOCYTES ABSOLUTE: 2.8 THOU/MM3 (ref 1–4.8)
MCH RBC QN AUTO: 30.2 PG (ref 26–33)
MCHC RBC AUTO-ENTMCNC: 33.7 GM/DL (ref 32.2–35.5)
MCV RBC AUTO: 89.6 FL (ref 81–99)
MONOCYTES # BLD: 10.9 %
MONOCYTES ABSOLUTE: 0.9 THOU/MM3 (ref 0.4–1.3)
NUCLEATED RED BLOOD CELLS: 0 /100 WBC
OPIATES, URINE: NEGATIVE
OSMOLALITY CALCULATION: 285.9 MOSMOL/KG (ref 275–300)
OXYCODONE: NEGATIVE
PHENCYCLIDINE QUANTITATIVE URINE: NEGATIVE
PLATELET # BLD: 322 THOU/MM3 (ref 130–400)
PMV BLD AUTO: 9.7 FL (ref 9.4–12.4)
POTASSIUM SERPL-SCNC: 3.3 MEQ/L (ref 3.5–5.2)
PREGNANCY, SERUM: NEGATIVE
RBC # BLD: 4.51 MILL/MM3 (ref 4.2–5.4)
SEG NEUTROPHILS: 52.1 %
SEGMENTED NEUTROPHILS ABSOLUTE COUNT: 4.3 THOU/MM3 (ref 1.8–7.7)
SODIUM BLD-SCNC: 144 MEQ/L (ref 135–145)
TROPONIN T: < 0.01 NG/ML
WBC # BLD: 8.3 THOU/MM3 (ref 4.8–10.8)

## 2019-01-07 PROCEDURE — 84484 ASSAY OF TROPONIN QUANT: CPT

## 2019-01-07 PROCEDURE — 99285 EMERGENCY DEPT VISIT HI MDM: CPT

## 2019-01-07 PROCEDURE — 84703 CHORIONIC GONADOTROPIN ASSAY: CPT

## 2019-01-07 PROCEDURE — 71046 X-RAY EXAM CHEST 2 VIEWS: CPT

## 2019-01-07 PROCEDURE — 2580000003 HC RX 258: Performed by: FAMILY MEDICINE

## 2019-01-07 PROCEDURE — 80048 BASIC METABOLIC PNL TOTAL CA: CPT

## 2019-01-07 PROCEDURE — 71275 CT ANGIOGRAPHY CHEST: CPT

## 2019-01-07 PROCEDURE — 96374 THER/PROPH/DIAG INJ IV PUSH: CPT

## 2019-01-07 PROCEDURE — 36415 COLL VENOUS BLD VENIPUNCTURE: CPT

## 2019-01-07 PROCEDURE — 85025 COMPLETE CBC W/AUTO DIFF WBC: CPT

## 2019-01-07 PROCEDURE — 6360000004 HC RX CONTRAST MEDICATION: Performed by: FAMILY MEDICINE

## 2019-01-07 PROCEDURE — 80307 DRUG TEST PRSMV CHEM ANLYZR: CPT

## 2019-01-07 PROCEDURE — 85379 FIBRIN DEGRADATION QUANT: CPT

## 2019-01-07 PROCEDURE — 93005 ELECTROCARDIOGRAM TRACING: CPT | Performed by: FAMILY MEDICINE

## 2019-01-07 PROCEDURE — 6360000002 HC RX W HCPCS: Performed by: FAMILY MEDICINE

## 2019-01-07 RX ORDER — KETOROLAC TROMETHAMINE 30 MG/ML
30 INJECTION, SOLUTION INTRAMUSCULAR; INTRAVENOUS ONCE
Status: COMPLETED | OUTPATIENT
Start: 2019-01-07 | End: 2019-01-07

## 2019-01-07 RX ORDER — NAPROXEN 500 MG/1
500 TABLET ORAL 2 TIMES DAILY
Qty: 20 TABLET | Refills: 0 | Status: SHIPPED | OUTPATIENT
Start: 2019-01-07 | End: 2020-03-06

## 2019-01-07 RX ORDER — 0.9 % SODIUM CHLORIDE 0.9 %
1000 INTRAVENOUS SOLUTION INTRAVENOUS ONCE
Status: COMPLETED | OUTPATIENT
Start: 2019-01-07 | End: 2019-01-07

## 2019-01-07 RX ADMIN — IOPAMIDOL 80 ML: 755 INJECTION, SOLUTION INTRAVENOUS at 21:56

## 2019-01-07 RX ADMIN — SODIUM CHLORIDE 1000 ML: 9 INJECTION, SOLUTION INTRAVENOUS at 20:47

## 2019-01-07 RX ADMIN — KETOROLAC TROMETHAMINE 30 MG: 30 INJECTION, SOLUTION INTRAMUSCULAR at 20:47

## 2019-01-07 ASSESSMENT — PAIN DESCRIPTION - PAIN TYPE: TYPE: ACUTE PAIN

## 2019-01-07 ASSESSMENT — PAIN DESCRIPTION - ORIENTATION: ORIENTATION: LEFT

## 2019-01-07 ASSESSMENT — PAIN SCALES - GENERAL
PAINLEVEL_OUTOF10: 7
PAINLEVEL_OUTOF10: 7

## 2019-01-07 ASSESSMENT — ENCOUNTER SYMPTOMS
EYE PAIN: 0
COUGH: 0
DIARRHEA: 0
ABDOMINAL PAIN: 0
SORE THROAT: 0
VOMITING: 0
RHINORRHEA: 0
WHEEZING: 0
NAUSEA: 0
EYE DISCHARGE: 0
BACK PAIN: 0
SHORTNESS OF BREATH: 1

## 2019-01-07 ASSESSMENT — PAIN DESCRIPTION - DESCRIPTORS: DESCRIPTORS: ACHING

## 2019-01-07 ASSESSMENT — HEART SCORE: ECG: 0

## 2019-01-07 ASSESSMENT — PAIN DESCRIPTION - LOCATION: LOCATION: ARM;CHEST

## 2019-01-08 PROCEDURE — 93010 ELECTROCARDIOGRAM REPORT: CPT | Performed by: NUCLEAR MEDICINE

## 2019-04-03 ENCOUNTER — HOSPITAL ENCOUNTER (EMERGENCY)
Age: 34
Discharge: HOME | End: 2019-04-03
Attending: EMERGENCY MEDICINE
Payer: COMMERCIAL

## 2019-04-03 ENCOUNTER — APPOINTMENT (OUTPATIENT)
Dept: GENERAL RADIOLOGY | Age: 34
End: 2019-04-03
Payer: COMMERCIAL

## 2019-04-03 VITALS
BODY MASS INDEX: 26.5 KG/M2 | RESPIRATION RATE: 16 BRPM | OXYGEN SATURATION: 100 % | SYSTOLIC BLOOD PRESSURE: 105 MMHG | HEIGHT: 60 IN | DIASTOLIC BLOOD PRESSURE: 80 MMHG | HEART RATE: 68 BPM | TEMPERATURE: 98.2 F | WEIGHT: 135 LBS

## 2019-04-03 DIAGNOSIS — F17.200 SMOKER: ICD-10-CM

## 2019-04-03 DIAGNOSIS — J20.8 ACUTE BRONCHITIS DUE TO OTHER SPECIFIED ORGANISMS: Primary | ICD-10-CM

## 2019-04-03 LAB
ANION GAP SERPL CALCULATED.3IONS-SCNC: 13 MEQ/L (ref 8–16)
BASOPHILS # BLD: 0.3 %
BASOPHILS ABSOLUTE: 0 THOU/MM3 (ref 0–0.1)
BUN BLDV-MCNC: 9 MG/DL (ref 7–22)
CALCIUM SERPL-MCNC: 9.2 MG/DL (ref 8.5–10.5)
CHLORIDE BLD-SCNC: 106 MEQ/L (ref 98–111)
CO2: 20 MEQ/L (ref 23–33)
CREAT SERPL-MCNC: 0.6 MG/DL (ref 0.4–1.2)
D-DIMER QUANTITATIVE: 365 NG/ML FEU (ref 0–500)
EKG ATRIAL RATE: 57 BPM
EKG P AXIS: 71 DEGREES
EKG P-R INTERVAL: 172 MS
EKG Q-T INTERVAL: 400 MS
EKG QRS DURATION: 74 MS
EKG QTC CALCULATION (BAZETT): 389 MS
EKG R AXIS: 71 DEGREES
EKG T AXIS: 67 DEGREES
EKG VENTRICULAR RATE: 57 BPM
EOSINOPHIL # BLD: 5.1 %
EOSINOPHILS ABSOLUTE: 0.3 THOU/MM3 (ref 0–0.4)
ERYTHROCYTE [DISTWIDTH] IN BLOOD BY AUTOMATED COUNT: 13.3 % (ref 11.5–14.5)
ERYTHROCYTE [DISTWIDTH] IN BLOOD BY AUTOMATED COUNT: 45.5 FL (ref 35–45)
GFR SERPL CREATININE-BSD FRML MDRD: > 90 ML/MIN/1.73M2
GLUCOSE BLD-MCNC: 81 MG/DL (ref 70–108)
HCT VFR BLD CALC: 42.3 % (ref 37–47)
HEMOGLOBIN: 14 GM/DL (ref 12–16)
IMMATURE GRANS (ABS): 0.01 THOU/MM3 (ref 0–0.07)
IMMATURE GRANULOCYTES: 0.2 %
LYMPHOCYTES # BLD: 44.6 %
LYMPHOCYTES ABSOLUTE: 2.7 THOU/MM3 (ref 1–4.8)
MCH RBC QN AUTO: 30.4 PG (ref 26–33)
MCHC RBC AUTO-ENTMCNC: 33.1 GM/DL (ref 32.2–35.5)
MCV RBC AUTO: 91.8 FL (ref 81–99)
MONOCYTES # BLD: 10.3 %
MONOCYTES ABSOLUTE: 0.6 THOU/MM3 (ref 0.4–1.3)
NUCLEATED RED BLOOD CELLS: 0 /100 WBC
OSMOLALITY CALCULATION: 275.3 MOSMOL/KG (ref 275–300)
PLATELET # BLD: 262 THOU/MM3 (ref 130–400)
PMV BLD AUTO: 9.6 FL (ref 9.4–12.4)
POTASSIUM SERPL-SCNC: 3.9 MEQ/L (ref 3.5–5.2)
PREGNANCY, SERUM: NEGATIVE
RBC # BLD: 4.61 MILL/MM3 (ref 4.2–5.4)
SEG NEUTROPHILS: 39.5 %
SEGMENTED NEUTROPHILS ABSOLUTE COUNT: 2.4 THOU/MM3 (ref 1.8–7.7)
SODIUM BLD-SCNC: 139 MEQ/L (ref 135–145)
TROPONIN T: < 0.01 NG/ML
WBC # BLD: 6 THOU/MM3 (ref 4.8–10.8)

## 2019-04-03 PROCEDURE — 93005 ELECTROCARDIOGRAM TRACING: CPT | Performed by: EMERGENCY MEDICINE

## 2019-04-03 PROCEDURE — 84484 ASSAY OF TROPONIN QUANT: CPT

## 2019-04-03 PROCEDURE — 71046 X-RAY EXAM CHEST 2 VIEWS: CPT

## 2019-04-03 PROCEDURE — 80048 BASIC METABOLIC PNL TOTAL CA: CPT

## 2019-04-03 PROCEDURE — 85379 FIBRIN DEGRADATION QUANT: CPT

## 2019-04-03 PROCEDURE — 85025 COMPLETE CBC W/AUTO DIFF WBC: CPT

## 2019-04-03 PROCEDURE — 6370000000 HC RX 637 (ALT 250 FOR IP): Performed by: EMERGENCY MEDICINE

## 2019-04-03 PROCEDURE — 84703 CHORIONIC GONADOTROPIN ASSAY: CPT

## 2019-04-03 PROCEDURE — 36415 COLL VENOUS BLD VENIPUNCTURE: CPT

## 2019-04-03 PROCEDURE — 93010 ELECTROCARDIOGRAM REPORT: CPT | Performed by: INTERNAL MEDICINE

## 2019-04-03 PROCEDURE — 94640 AIRWAY INHALATION TREATMENT: CPT

## 2019-04-03 PROCEDURE — 99284 EMERGENCY DEPT VISIT MOD MDM: CPT

## 2019-04-03 RX ORDER — AZITHROMYCIN 250 MG/1
TABLET, FILM COATED ORAL
Qty: 1 PACKET | Refills: 0 | Status: SHIPPED | OUTPATIENT
Start: 2019-04-03 | End: 2019-04-13

## 2019-04-03 RX ORDER — IPRATROPIUM BROMIDE AND ALBUTEROL SULFATE 2.5; .5 MG/3ML; MG/3ML
1 SOLUTION RESPIRATORY (INHALATION) ONCE
Status: COMPLETED | OUTPATIENT
Start: 2019-04-03 | End: 2019-04-03

## 2019-04-03 RX ORDER — ALBUTEROL SULFATE 90 UG/1
AEROSOL, METERED RESPIRATORY (INHALATION)
Qty: 1 INHALER | Refills: 0 | Status: SHIPPED | OUTPATIENT
Start: 2019-04-03 | End: 2020-03-06

## 2019-04-03 RX ADMIN — IPRATROPIUM BROMIDE AND ALBUTEROL SULFATE 1 AMPULE: .5; 3 SOLUTION RESPIRATORY (INHALATION) at 13:52

## 2019-04-03 ASSESSMENT — ENCOUNTER SYMPTOMS
SHORTNESS OF BREATH: 0
NAUSEA: 0
BACK PAIN: 0
COUGH: 1
ABDOMINAL PAIN: 0
VOMITING: 0
EYE DISCHARGE: 0
APNEA: 1
RHINORRHEA: 0
SORE THROAT: 0
EYE PAIN: 0
CHEST TIGHTNESS: 1
WHEEZING: 0
DIARRHEA: 0

## 2019-04-03 NOTE — ED PROVIDER NOTES
[address] [address]   eMERGENCY dEPARTMENT eNCOUnter        [address] [facility]    Chief Complaint   Patient presents with    Cough    Shortness of Breath       Nurses Notes reviewed and I agree except as noted in the HPI. HPI    Elizabeth Stein is a 35 y.o. female who presents for evaluation of chest tightness, shortness of breath, and cough that occurs while the patient is sleeping. The patient states she has woken up in the night due to inability to breath for 2 weeks now, and it takes using her abdominal muscles to cough, which reveals watery production. She denies experiencing a sore throat, fever, chills, indigestion, and vomiting. She does state she experiences dizziness at \"random times\". She admits to smoking, and denies asthma, birth control, and use of regular medications. She states she has tried to quit smoking, but vomited with Chantix use. The patient did not state any other complaints or symptoms during my initial encounter. REVIEW OF SYSTEMS    Review of Systems   Constitutional: Negative for appetite change, chills, fatigue and fever. HENT: Negative for congestion, ear pain, rhinorrhea and sore throat. Eyes: Negative for pain, discharge and visual disturbance. Respiratory: Positive for apnea (at night), cough (watery production) and chest tightness. Negative for shortness of breath and wheezing. Cardiovascular: Negative for chest pain, palpitations and leg swelling. Gastrointestinal: Negative for abdominal pain, diarrhea, nausea and vomiting. Genitourinary: Negative for difficulty urinating, dysuria, hematuria and vaginal discharge. Musculoskeletal: Negative for arthralgias, back pain, joint swelling and neck pain. Skin: Negative for pallor and rash. Neurological: Positive for dizziness (intermittent). Negative for syncope, weakness, light-headedness, numbness and headaches. Hematological: Negative for adenopathy. Psychiatric/Behavioral: Positive for sleep disturbance (secondary to apnea). Negative for confusion and suicidal ideas. Dysphoric mood: .[facility]. The patient is not nervous/anxious. PAST MEDICAL HISTORY     has a past medical history of Endometriosis. SURGICAL HISTORY   has a past surgical history that includes Tubal ligation and Dilation and curettage of uterus. CURRENT MEDICATIONS    Discharge Medication List as of 4/3/2019  2:26 PM      CONTINUE these medications which have NOT CHANGED    Details   naproxen (NAPROSYN) 500 MG tablet Take 1 tablet by mouth 2 times daily, Disp-20 tablet, R-0Print             ALLERGIES    is allergic to penicillins. FAMILY HISTORY    has no family status information on file. family history is not on file. SOCIAL HISTORY     reports that she has been smoking cigarettes. She has smoked for the past 20.00 years. She has never used smokeless tobacco. She reports that she drinks alcohol. She reports that she does not use drugs. PHYSICAL EXAM      INITIAL VITALS: /80   Pulse 68   Temp 98.2 °F (36.8 °C) (Oral)   Resp 16   Ht 5' (1.524 m)   Wt 135 lb (61.2 kg)   SpO2 100%   BMI 26.37 kg/m² Estimated body mass index is 26.37 kg/m² as calculated from the following:    Height as of this encounter: 5' (1.524 m). Weight as of this encounter: 135 lb (61.2 kg). Physical Exam   Constitutional: She is oriented to person, place, and time. She appears well-developed and well-nourished. Non-toxic appearance. HENT:   Head: Normocephalic and atraumatic. Right Ear: Tympanic membrane and external ear normal.   Left Ear: Tympanic membrane and external ear normal.   Nose: Nose normal.   Mouth/Throat: Oropharynx is clear and moist and mucous membranes are normal. No oropharyngeal exudate, posterior oropharyngeal edema or posterior oropharyngeal erythema. Eyes: Conjunctivae and EOM are normal.   Neck: Normal range of motion. Neck supple. No JVD present. acromion of uncertain etiology. This could be the sequelae from prior trauma. **This report has been created using voice recognition software. It may contain minor errors which are inherent in voice recognition technology. **      Final report electronically signed by Dr Tyesha Oquendo on 4/3/2019 12:15 PM          LABS:   Labs Reviewed   CBC WITH AUTO DIFFERENTIAL - Abnormal; Notable for the following components:       Result Value    RDW-SD 45.5 (*)     All other components within normal limits   BASIC METABOLIC PANEL - Abnormal; Notable for the following components:    CO2 20 (*)     All other components within normal limits   D-DIMER, QUANTITATIVE   TROPONIN   HCG, SERUM, QUALITATIVE   ANION GAP   GLOMERULAR FILTRATION RATE, ESTIMATED   OSMOLALITY     All other unresulted laboratory test above are normal:    Vitals:    Vitals:    04/03/19 1133 04/03/19 1302   BP: 106/81 105/80   Pulse: 76 68   Resp: 18 16   Temp: 98.2 °F (36.8 °C)    TempSrc: Oral    SpO2: 100% 100%   Weight: 135 lb (61.2 kg)    Height: 5' (1.524 m)        EMERGENCY DEPARTMENT COURSE:    Medications   ipratropium-albuterol (DUONEB) nebulizer solution 1 ampule (1 ampule Inhalation Given 4/3/19 1352)       The pt was seen and evaluated by me. Within the department, I observed the pt's vital signs to be within acceptable range. Laboratory and Radiological studieswere performed, results were reviewed with the patient. Within the department, the pt was treated with Duoneb. I observed the pt's condition to improve with the breathing treatment during the duration of their stay. I explained my proposed course of treatmentto the pt, and they were amenable to my decision. They were discharged home, and they will return to the ED if their symptoms become more severe in nature, or otherwise change. CRITICAL CARE:   None. CONSULTS:  None    PROCEDURES:  None. FINAL IMPRESSION       1.  Acute bronchitis due to other specified organisms

## 2019-04-03 NOTE — ED NOTES
Upon first encounter pt resting in bed reports that over the last couple weeks she wakes with sob and chest tightness. Pt denies pain and sob and this time. Respirations easy and unlabored.  Pt waiting for provider to discuss the [address], RN  04/03/19 [id number]

## 2019-04-03 NOTE — ED NOTES
Patient presents to the ED with complaint of cough and SOB. Patient reports symptoms have been ongoing for approximately 2 weeks without improvement. Respirations even and unlabored. No s/s of distress noted.       Denisse Mercado RN  04/03/19 [id number]

## 2019-04-03 NOTE — ED NOTES
Pt informed of orders placed and ekg obtained. Lab at the bedside to collect blood work ordered. Pt currently denies pain at this time. No distress noted and respirations remain easy and unlabored. Will continue to monitor.  Call light in reach      [facility], RN  04/03/19 0530

## 2019-05-20 ENCOUNTER — HOSPITAL ENCOUNTER (EMERGENCY)
Age: 34
Discharge: HOME | End: 2019-05-20
Payer: COMMERCIAL

## 2019-05-20 VITALS
OXYGEN SATURATION: 98 % | HEART RATE: 66 BPM | RESPIRATION RATE: 16 BRPM | DIASTOLIC BLOOD PRESSURE: 75 MMHG | TEMPERATURE: 97 F | WEIGHT: 129 LBS | SYSTOLIC BLOOD PRESSURE: 100 MMHG | BODY MASS INDEX: 23.22 KG/M2

## 2019-05-20 DIAGNOSIS — R11.0 NAUSEA: ICD-10-CM

## 2019-05-20 DIAGNOSIS — L25.9 CONTACT DERMATITIS, UNSPECIFIED CONTACT DERMATITIS TYPE, UNSPECIFIED TRIGGER: Primary | ICD-10-CM

## 2019-05-20 LAB — GLUCOSE BLD-MCNC: 95 MG/DL (ref 70–108)

## 2019-05-20 PROCEDURE — 99214 OFFICE O/P EST MOD 30 MIN: CPT | Performed by: NURSE PRACTITIONER

## 2019-05-20 PROCEDURE — 6360000002 HC RX W HCPCS: Performed by: NURSE PRACTITIONER

## 2019-05-20 PROCEDURE — 99213 OFFICE O/P EST LOW 20 MIN: CPT

## 2019-05-20 PROCEDURE — 82948 REAGENT STRIP/BLOOD GLUCOSE: CPT

## 2019-05-20 PROCEDURE — 96372 THER/PROPH/DIAG INJ SC/IM: CPT

## 2019-05-20 PROCEDURE — 2709999900 HC NON-CHARGEABLE SUPPLY

## 2019-05-20 RX ORDER — METHYLPREDNISOLONE ACETATE 80 MG/ML
80 INJECTION, SUSPENSION INTRA-ARTICULAR; INTRALESIONAL; INTRAMUSCULAR; SOFT TISSUE ONCE
Status: COMPLETED | OUTPATIENT
Start: 2019-05-20 | End: 2019-05-20

## 2019-05-20 RX ORDER — BETAMETHASONE DIPROPIONATE 0.05 %
OINTMENT (GRAM) TOPICAL
Qty: 1 TUBE | Refills: 0 | Status: SHIPPED | OUTPATIENT
Start: 2019-05-20 | End: 2020-03-06

## 2019-05-20 RX ADMIN — METHYLPREDNISOLONE ACETATE 80 MG: 80 INJECTION, SUSPENSION INTRA-ARTICULAR; INTRALESIONAL; INTRAMUSCULAR; SOFT TISSUE at 09:16

## 2019-05-20 ASSESSMENT — ENCOUNTER SYMPTOMS
SHORTNESS OF BREATH: 0
ABDOMINAL PAIN: 0
DIARRHEA: 0
SORE THROAT: 0
NAUSEA: 1
VOMITING: 0
CHEST TIGHTNESS: 0

## 2019-05-20 NOTE — ED TRIAGE NOTES
Pt to room 1 with c/o itching starting 2 days ago. She states it is \"all over\" and she does report changing her detergent recently to [employer] detergent. She also states she has been having episodes of nausea and that it has been intermittent for about 2 years. She states she has noticed more in the last few days.  She also states she has been told that she has \"blood sugar drops\" in th past.

## 2019-05-20 NOTE — ED PROVIDER NOTES
[address]       Chief Complaint   Patient presents with    Pruritis     \"all over\"    Nausea       Nurses Notes reviewed and I agree except as noted in the HPI. HISTORY OF PRESENT ILLNESS   Bacilio Du is a 35 y.o. female who presents for evaluation of itching all over that started this morning and some nausea. She went to work today but they sent her home. Patient has not taken any medications or used any medications for her symptoms. She states that she recently changed laundry detergents. She is also had some nausea. She is concerned of low blood sugars because she has previously been told she runs low. She denies any urinary symptoms or chance of pregnancy as she has had a tubal ligation. REVIEW OF SYSTEMS     Review of Systems   Constitutional: Negative for chills and fever. HENT: Negative for sore throat. Eyes: Negative for visual disturbance. Respiratory: Negative for chest tightness and shortness of breath. Cardiovascular: Negative for chest pain. Gastrointestinal: Positive for nausea. Negative for abdominal pain, diarrhea and vomiting. Genitourinary: Negative for dysuria, frequency and urgency. Musculoskeletal: Negative for joint swelling. Skin: Positive for rash. Allergic/Immunologic: Negative for environmental allergies and food allergies. Neurological: Negative for headaches. Psychiatric/Behavioral: The patient is not nervous/anxious. PAST MEDICAL HISTORY         Diagnosis Date    Asthma     AS A CHILD    Bronchitis     Endometriosis     PID (acute pelvic inflammatory disease) 2003    Placenta previa 2007       SURGICAL HISTORY     Patient  has a past surgical history that includes Tubal ligation; Dilation and curettage of uterus (2001); laparoscopy (10/10/2017); and laparoscopy (N/A, 10/10/2017).     CURRENT MEDICATIONS       Previous Medications    MELATONIN 10 MG CAPS    Take 1 capsule by mouth nightly       ALLERGIES     Patient is is allergic to pcn [penicillins]. FAMILY HISTORY     Patient'sfamily history includes Cancer in her maternal grandfather and maternal grandmother. SOCIAL HISTORY     Patient  reports that she has been smoking cigarettes. She has a 9.00 pack-year smoking history. She has never used smokeless tobacco. She reports that she does not drink alcohol or use drugs. PHYSICAL EXAM     ED TRIAGE VITALS  BP: 118/70, Temp: 97 °F (36.1 °C), Pulse: 69, Resp: 16, SpO2: 100 %  Physical Exam   Constitutional: She is oriented to person, place, and time. She appears well-developed and well-nourished. She is cooperative. No distress. HENT:   Head: Normocephalic and atraumatic. Mouth/Throat: Mucous membranes are normal.   Eyes: Conjunctivae are normal. Right conjunctiva is not injected. Left conjunctiva is not injected. Pupils are equal.   Neck: Normal range of motion. Cardiovascular: Normal rate and normal heart sounds. Pulmonary/Chest: Effort normal and breath sounds normal.   Abdominal: Soft. Normal appearance. There is no tenderness. Musculoskeletal: Normal range of motion. Lymphadenopathy:     She has no cervical adenopathy. Neurological: She is alert and oriented to person, place, and time. Skin: Skin is warm and dry. Rash (scattered consistent with contact dermatitis) noted. Psychiatric: She has a normal mood and affect. Her speech is normal.   Nursing note and vitals reviewed.       DIAGNOSTIC RESULTS   Labs:   Results for orders placed or performed during the hospital encounter of 05/20/19   POCT Glucose   Result Value Ref Range    POC Glucose 95 70 - 108 mg/dl       IMAGING:  No orders to display     URGENT CARE COURSE:     Vitals:    05/20/19 0857   BP: 118/70   Pulse: 69   Resp: 16   Temp: 97 °F (36.1 °C)   TempSrc: Temporal   SpO2: 100%   Weight: 129 lb (58.5 kg)       Medications   methylPREDNISolone acetate (DEPO-MEDROL) injection 80 mg (80

## 2020-03-06 ENCOUNTER — HOSPITAL ENCOUNTER (EMERGENCY)
Age: 35
Discharge: HOME | End: 2020-03-06
Payer: COMMERCIAL

## 2020-03-06 VITALS
DIASTOLIC BLOOD PRESSURE: 62 MMHG | HEIGHT: 60 IN | TEMPERATURE: 98.5 F | BODY MASS INDEX: 23.75 KG/M2 | RESPIRATION RATE: 16 BRPM | HEART RATE: 74 BPM | SYSTOLIC BLOOD PRESSURE: 104 MMHG | WEIGHT: 121 LBS | OXYGEN SATURATION: 99 %

## 2020-03-06 PROCEDURE — 99213 OFFICE O/P EST LOW 20 MIN: CPT | Performed by: NURSE PRACTITIONER

## 2020-03-06 PROCEDURE — 99212 OFFICE O/P EST SF 10 MIN: CPT

## 2020-03-06 RX ORDER — BROMPHENIRAMINE MALEATE, PSEUDOEPHEDRINE HYDROCHLORIDE, AND DEXTROMETHORPHAN HYDROBROMIDE 2; 30; 10 MG/5ML; MG/5ML; MG/5ML
2.5 SYRUP ORAL 4 TIMES DAILY PRN
Qty: 60 ML | Refills: 0 | Status: SHIPPED | OUTPATIENT
Start: 2020-03-06 | End: 2020-10-09

## 2020-03-06 ASSESSMENT — ENCOUNTER SYMPTOMS
SINUS PAIN: 0
STRIDOR: 0
SORE THROAT: 1
CHOKING: 0
COUGH: 1
RHINORRHEA: 1
SHORTNESS OF BREATH: 0
SWOLLEN GLANDS: 0
CHEST TIGHTNESS: 0
WHEEZING: 0
APNEA: 0

## 2020-03-06 NOTE — ED PROVIDER NOTES
inflammatory disease) 2003    Placenta previa 2007       SURGICAL HISTORY     Patient  has a past surgical history that includes Dilation and curettage of uterus; Tubal ligation; Dilation and curettage of uterus (2001); laparoscopy (10/10/2017); and laparoscopy (N/A, 10/10/2017). CURRENT MEDICATIONS       Previous Medications    SPACER/AERO CHAMBER MOUTHPIECE MISC    1 each by Does not apply route once as needed (SOB and wheezing)       ALLERGIES     Patient is is allergic to pcn [penicillins] and penicillins. FAMILY HISTORY     Patient's family history includes Cancer in her maternal grandfather and maternal grandmother. SOCIAL HISTORY     Patient  reports that she has been smoking cigarettes. She has a 9.00 pack-year smoking history. She has never used smokeless tobacco. She reports that she does not drink alcohol or use drugs. PHYSICAL EXAM     ED TRIAGE VITALS  BP: (!) 97/58, Temp: 98.5 °F (36.9 °C), Pulse: 76, Resp: 16, SpO2: 99 %  Physical Exam  Vitals signs and nursing note reviewed. Exam conducted with a chaperone present. Constitutional:       General: She is not in acute distress. Appearance: Normal appearance. She is not ill-appearing, toxic-appearing or diaphoretic. HENT:      Head: Normocephalic and atraumatic. Right Ear: Hearing, tympanic membrane, ear canal and external ear normal.      Left Ear: Hearing, tympanic membrane and external ear normal. Swelling present. Nose: Congestion and rhinorrhea present. Mouth/Throat:      Lips: Pink. No lesions. Mouth: Mucous membranes are moist.      Pharynx: Uvula midline. Posterior oropharyngeal erythema present. No pharyngeal swelling, oropharyngeal exudate or uvula swelling. Tonsils: No tonsillar exudate or tonsillar abscesses. Eyes:      Extraocular Movements: Extraocular movements intact. Conjunctiva/sclera: Conjunctivae normal.   Pulmonary:      Effort: Pulmonary effort is normal. No respiratory distress. Breath sounds: Normal breath sounds. No stridor. No wheezing, rhonchi or rales. Chest:      Chest wall: No tenderness. Musculoskeletal: Normal range of motion. Skin:     General: Skin is warm. Neurological:      General: No focal deficit present. Mental Status: She is alert and oriented to person, place, and time. Psychiatric:         Mood and Affect: Mood normal.         Behavior: Behavior normal.         Thought Content: Thought content normal.         Judgment: Judgment normal.         DIAGNOSTIC RESULTS   Labs:No results found for this visit on 03/06/20. IMAGING:  No orders to display     URGENT CARE COURSE:     Vitals:    03/06/20 1037   BP: (!) 97/58   Pulse: 76   Resp: 16   Temp: 98.5 °F (36.9 °C)   TempSrc: Temporal   SpO2: 99%   Weight: 121 lb (54.9 kg)   Height: 5' (1.524 m)       Medications - No data to display  PROCEDURES:  None  FINAL IMPRESSION      1. Acute viral sinusitis        DISPOSITION/PLAN     Drink lots of fluids  Take Motrin or Tylenol for headache  Humidification of air  Use nasal spray as directed  Take a nasal decongestant as directed  Monitor for any fever increased and sinus pain or pressure  Follow-up see her primary care provider in 48 hours  Or go to the emergency department for any changes or concerns.     PATIENT REFERRED TO:  Delmer Alejandro  [address] [place] [address]    Schedule an appointment as soon as possible for a visit   As needed    DISCHARGE MEDICATIONS:  New Prescriptions    BROMPHENIRAMINE-PSEUDOEPHEDRINE-DM (BROMFED DM) 2-30-10 MG/5ML SYRUP    Take 2.5 mLs by mouth 4 times daily as needed for Congestion or Cough (headache)     Current Discharge Medication List          YVETTE Nick - YVETTE Napoles CNP  03/06/20 7299

## 2020-08-10 ENCOUNTER — HOSPITAL ENCOUNTER (EMERGENCY)
Age: 35
Discharge: HOME | End: 2020-08-10
Payer: COMMERCIAL

## 2020-08-10 VITALS
WEIGHT: 131 LBS | SYSTOLIC BLOOD PRESSURE: 108 MMHG | RESPIRATION RATE: 16 BRPM | OXYGEN SATURATION: 100 % | TEMPERATURE: 98.2 F | BODY MASS INDEX: 25.72 KG/M2 | DIASTOLIC BLOOD PRESSURE: 65 MMHG | HEIGHT: 60 IN | HEART RATE: 74 BPM

## 2020-08-10 LAB
GROUP A STREP CULTURE, REFLEX: POSITIVE
REFLEX THROAT C + S: NORMAL

## 2020-08-10 PROCEDURE — 99213 OFFICE O/P EST LOW 20 MIN: CPT

## 2020-08-10 PROCEDURE — 99212 OFFICE O/P EST SF 10 MIN: CPT | Performed by: NURSE PRACTITIONER

## 2020-08-10 PROCEDURE — 87880 STREP A ASSAY W/OPTIC: CPT

## 2020-08-10 RX ORDER — AZITHROMYCIN 250 MG/1
TABLET, FILM COATED ORAL
Qty: 1 PACKET | Refills: 0 | Status: SHIPPED | OUTPATIENT
Start: 2020-08-10 | End: 2020-08-14

## 2020-08-10 RX ORDER — ACETAMINOPHEN 500 MG
1000 TABLET ORAL EVERY 6 HOURS PRN
COMMUNITY

## 2020-08-10 ASSESSMENT — ENCOUNTER SYMPTOMS
SHORTNESS OF BREATH: 0
NAUSEA: 0
DIARRHEA: 0
RHINORRHEA: 1
CHEST TIGHTNESS: 1
SORE THROAT: 1
COUGH: 1
EYE PAIN: 0
ABDOMINAL PAIN: 0
SINUS PRESSURE: 1

## 2020-08-10 ASSESSMENT — PAIN DESCRIPTION - FREQUENCY: FREQUENCY: CONTINUOUS

## 2020-08-10 ASSESSMENT — PAIN SCALES - GENERAL: PAINLEVEL_OUTOF10: 7

## 2020-08-10 ASSESSMENT — PAIN DESCRIPTION - DESCRIPTORS: DESCRIPTORS: ACHING;SORE

## 2020-08-10 ASSESSMENT — PAIN DESCRIPTION - LOCATION: LOCATION: THROAT

## 2020-08-10 ASSESSMENT — PAIN DESCRIPTION - PAIN TYPE: TYPE: ACUTE PAIN

## 2020-08-10 NOTE — ED NOTES
Pt verbalized discharge instructions. Pt informed to go to ER if develop chest pain, shortness of breath or abdominal pain. Pt ambulatory out in stable condition. Assessment unchanged.        Yovana Mahajan RN  08/10/20 0858

## 2020-08-10 NOTE — ED PROVIDER NOTES
these medications which have NOT CHANGED    Details   acetaminophen (TYLENOL) 500 MG tablet Take 1,000 mg by mouth every 6 hours as needed for PainHistorical Med      brompheniramine-pseudoephedrine-DM (BROMFED DM) 2-30-10 MG/5ML syrup Take 2.5 mLs by mouth 4 times daily as needed for Congestion or Cough (headache), Disp-60 mL, R-0Normal      Spacer/Aero Chamber Mouthpiece MISC ONCE PRN Starting Wed 4/3/2019, Until Wed 4/3/2019, For 1 dose, Disp-1 each, R-0, Print             ALLERGIES     Patient is is allergic to pcn [penicillins] and penicillins. Patients   Immunization History   Administered Date(s) Administered    Hepatitis B 09/26/2014    Hepatitis B (Recombivax HB) 09/26/2014       FAMILY HISTORY     Patient's family history includes Cancer in her maternal grandfather and maternal grandmother. SOCIAL HISTORY     Patient  reports that she has been smoking cigarettes. She has a 9.00 pack-year smoking history. She has never used smokeless tobacco. She reports current alcohol use. She reports that she does not use drugs. PHYSICAL EXAM     ED TRIAGE VITALS  BP: 108/65, Temp: 98.2 °F (36.8 °C), Pulse: 74, Resp: 16, SpO2: 100 %,Estimated body mass index is 25.58 kg/m² as calculated from the following:    Height as of this encounter: 5' (1.524 m). Weight as of this encounter: 131 lb (59.4 kg). ,Patient's last menstrual period was 07/30/2020. Physical Exam  Vitals signs and nursing note reviewed. Constitutional:       General: She is not in acute distress. Appearance: She is ill-appearing. HENT:      Head: Normocephalic. Right Ear: Tympanic membrane, ear canal and external ear normal.      Left Ear: Tympanic membrane, ear canal and external ear normal.      Nose: Rhinorrhea present. Mouth/Throat:      Mouth: Mucous membranes are moist.      Pharynx: Oropharyngeal exudate and posterior oropharyngeal erythema present.    Eyes:      Conjunctiva/sclera: Conjunctivae normal.      Pupils: Pupils are equal, round, and reactive to light. Neck:      Musculoskeletal: Normal range of motion. Cardiovascular:      Rate and Rhythm: Normal rate and regular rhythm. Heart sounds: Normal heart sounds. Pulmonary:      Effort: Pulmonary effort is normal.      Breath sounds: Normal breath sounds. Musculoskeletal: Normal range of motion. Lymphadenopathy:      Cervical: No cervical adenopathy. Skin:     General: Skin is warm and dry. Neurological:      Mental Status: She is alert and oriented to person, place, and time. Psychiatric:         Mood and Affect: Mood normal.         Behavior: Behavior normal.         Thought Content: Thought content normal.         Judgment: Judgment normal.         DIAGNOSTIC RESULTS     Labs:  Results for orders placed or performed during the hospital encounter of 08/10/20   STREP A ANTIGEN   Result Value Ref Range    GROUP A STREP CULTURE, REFLEX Positive        IMAGING:    No orders to display       EKG:  None    URGENT CARE COURSE:     Vitals:    08/10/20 1337   BP: 108/65   Pulse: 74   Resp: 16   Temp: 98.2 °F (36.8 °C)   TempSrc: Temporal   SpO2: 100%   Weight: 131 lb (59.4 kg)   Height: 5' (1.524 m)       Medications - No data to display       PROCEDURES:  None    FINAL IMPRESSION      1. Strep pharyngitis        DISPOSITION/ PLAN     Patient presented today with sore throats, body aches, and headache. Her strep swab was positive. She has a allergy to penicillins so we will treat her with azithromycin for 5 days. Instructions were given regarding sore throat and strep pharyngitis. She was instructed to follow-up with her PCP or go to the emergency department after her symptoms fail or worsen. She voiced understanding. Patient was discharged with oral antibiotic prescription and stable condition. PATIENT REFERRED TO:  No primary care provider on file. No primary physician on file.       DISCHARGE MEDICATIONS:  Discharge Medication List as of 8/10/2020  2:13 PM      START taking these medications    Details   azithromycin (ZITHROMAX Z-ITZEL) 250 MG tablet Take 2 tablets (500 mg) on Day 1, and then take 1 tablet (250 mg) on days 2 through 5., Disp-1 packet,R-0Print             Discharge Medication List as of 8/10/2020  2:13 PM          Discharge Medication List as of 8/10/2020  2:13 PM          YVETTE Baeza CNP    (Please note that portions of this note were completed with a voice recognition program. Efforts were made to edit the dictations but occasionally words are mis-transcribed.)           YVETTE Baeza CNP  08/10/20 7733

## 2020-08-10 NOTE — ED TRIAGE NOTES
Pt ambulatory into esuc with c/o sore throat and upper body aches that started today. Pt states throat pain 7 and body 4.

## 2020-08-11 ENCOUNTER — CARE COORDINATION (OUTPATIENT)
Dept: CARE COORDINATION | Age: 35
End: 2020-08-11

## 2020-08-11 NOTE — CARE COORDINATION
Attempted outreach for [id number] monitoring after UC visit. Left message to return phone call to ambulatory care manager at [phone].

## 2020-08-12 ENCOUNTER — CARE COORDINATION (OUTPATIENT)
Dept: CARE COORDINATION | Age: 35
End: 2020-08-12

## 2020-08-12 NOTE — CARE COORDINATION
Patient contacted regarding recent discharge and COVID-19 risk. Discussed COVID-19 related testing which was not done at this time. Test results were not done. Patient informed of results, if available? No     Care Transition Nurse/ Ambulatory Care Manager contacted the patient by telephone to perform post discharge assessment. Verified name and  with patient as identifiers. Patient has following risk factors of: no known risk factors. CTN/ACM reviewed discharge instructions, medical action plan and red flags related to discharge diagnosis. Reviewed and educated them on any new and changed medications related to discharge diagnosis. Advised obtaining a 90-day supply of all daily and as-needed medications. Education provided regarding infection prevention, and signs and symptoms of COVID-19 and when to seek medical attention with patient who verbalized understanding. Discussed exposure protocols and quarantine from [address] you at higher risk for severe illness  and given an opportunity for questions and concerns. The patient agrees to contact the COVID-19 hotline [phone] or PCP office for questions related to their healthcare. CTN/ACM provided contact information for future reference. From CDC: Are you at higher risk for severe illness?  Wash your hands often.  Avoid close contact (6 feet, which is about two arm lengths) with people who are sick.  Put distance between yourself and other people if COVID-19 is spreading in your community.  Clean and disinfect frequently touched surfaces.  Avoid all cruise travel and non-essential air travel.  Call your healthcare professional if you have concerns about COVID-19 and your underlying condition or if you are sick. For more information on steps you can take to protect yourself, see CDC's How to Protect Yourself    Pt will be further monitored by COVID Loop Team based on severity of symptoms and risk factors.   Starting to feel better. Taking abx. Patient referred for LOOP Program:   Patient's preferred e-mail:  [email or web address]  Patient's preferred phone number: [phone]  Care Plan: active  LOOP Provider: Emma Coronado

## 2020-10-09 ENCOUNTER — HOSPITAL ENCOUNTER (EMERGENCY)
Age: 35
Discharge: HOME | End: 2020-10-09
Payer: COMMERCIAL

## 2020-10-09 VITALS
TEMPERATURE: 97.9 F | DIASTOLIC BLOOD PRESSURE: 66 MMHG | SYSTOLIC BLOOD PRESSURE: 99 MMHG | HEART RATE: 88 BPM | WEIGHT: 130 LBS | BODY MASS INDEX: 25.39 KG/M2 | OXYGEN SATURATION: 98 % | RESPIRATION RATE: 16 BRPM

## 2020-10-09 LAB
BILIRUBIN URINE: NEGATIVE
BLOOD, URINE: NEGATIVE
CHARACTER, URINE: ABNORMAL
COLOR: ABNORMAL
GLUCOSE URINE: NEGATIVE MG/DL
KETONES, URINE: NEGATIVE
LEUKOCYTE ESTERASE, URINE: ABNORMAL
NITRITE, URINE: NEGATIVE
PH, URINE: 8.5 (ref 5–9)
PREGNANCY, URINE: NEGATIVE
PROTEIN, URINE: 30 MG/DL
SPECIFIC GRAVITY, URINE: 1.02 (ref 1–1.03)
UROBILINOGEN, URINE: 1 EU/DL (ref 0.2–1)

## 2020-10-09 PROCEDURE — 87186 SC STD MICRODIL/AGAR DIL: CPT

## 2020-10-09 PROCEDURE — 87077 CULTURE AEROBIC IDENTIFY: CPT

## 2020-10-09 PROCEDURE — 99213 OFFICE O/P EST LOW 20 MIN: CPT

## 2020-10-09 PROCEDURE — 81003 URINALYSIS AUTO W/O SCOPE: CPT

## 2020-10-09 PROCEDURE — 87086 URINE CULTURE/COLONY COUNT: CPT

## 2020-10-09 PROCEDURE — 81025 URINE PREGNANCY TEST: CPT

## 2020-10-09 PROCEDURE — 99214 OFFICE O/P EST MOD 30 MIN: CPT | Performed by: NURSE PRACTITIONER

## 2020-10-09 RX ORDER — PHENAZOPYRIDINE HYDROCHLORIDE 100 MG/1
100 TABLET, FILM COATED ORAL 3 TIMES DAILY PRN
Qty: 9 TABLET | Refills: 0 | Status: SHIPPED | OUTPATIENT
Start: 2020-10-09 | End: 2020-10-09 | Stop reason: SDUPTHER

## 2020-10-09 RX ORDER — NITROFURANTOIN 25; 75 MG/1; MG/1
100 CAPSULE ORAL 2 TIMES DAILY
Qty: 10 CAPSULE | Refills: 0 | Status: SHIPPED | OUTPATIENT
Start: 2020-10-09 | End: 2020-10-14

## 2020-10-09 RX ORDER — PHENAZOPYRIDINE HYDROCHLORIDE 100 MG/1
100 TABLET, FILM COATED ORAL 3 TIMES DAILY PRN
Qty: 9 TABLET | Refills: 0 | Status: SHIPPED | OUTPATIENT
Start: 2020-10-09 | End: 2020-10-12

## 2020-10-09 RX ORDER — NITROFURANTOIN 25; 75 MG/1; MG/1
100 CAPSULE ORAL 2 TIMES DAILY
Qty: 10 CAPSULE | Refills: 0 | Status: SHIPPED | OUTPATIENT
Start: 2020-10-09 | End: 2020-10-09 | Stop reason: SDUPTHER

## 2020-10-09 ASSESSMENT — ENCOUNTER SYMPTOMS
ABDOMINAL PAIN: 0
DIARRHEA: 0
VOMITING: 0
NAUSEA: 0
SHORTNESS OF BREATH: 0
CHEST TIGHTNESS: 0

## 2020-10-09 NOTE — ED PROVIDER NOTES
[address] [facility] Encounter      CHIEFCOMPLAINT       Chief Complaint   Patient presents with    Urinary Tract Infection       Nurses Notes reviewed and I agree except as noted in the HPI. HISTORY OF PRESENT ILLNESS   Vinicius Grijalva is a 29 y.o. female who presents for evaluation of a UTI started earlier this week. She states that she has been drinking extra water and cranberry juice with no relief. Denies concern for STD testing today. REVIEW OF SYSTEMS     Review of Systems   Constitutional: Negative for chills and fever. Respiratory: Negative for chest tightness and shortness of breath. Cardiovascular: Negative for chest pain. Gastrointestinal: Negative for abdominal pain, diarrhea, nausea and vomiting. Genitourinary: Positive for dysuria, frequency and urgency. Negative for flank pain and hematuria. Skin: Negative for rash. Allergic/Immunologic: Negative for environmental allergies and food allergies. Neurological: Negative for headaches. PAST MEDICAL HISTORY         Diagnosis Date    Asthma     AS A CHILD    Bronchitis     Endometriosis     PID (acute pelvic inflammatory disease) 2003    Placenta previa 2007       SURGICAL HISTORY     Patient  has a past surgical history that includes Dilation and curettage of uterus; Tubal ligation; Dilation and curettage of uterus (2001); laparoscopy (10/10/2017); and laparoscopy (N/A, 10/10/2017). CURRENT MEDICATIONS       Discharge Medication List as of 10/9/2020  6:50 PM      CONTINUE these medications which have NOT CHANGED    Details   acetaminophen (TYLENOL) 500 MG tablet Take 1,000 mg by mouth every 6 hours as needed for PainHistorical Med             ALLERGIES     Patient is is allergic to pcn [penicillins] and penicillins. FAMILY HISTORY     Patient'sfamily history includes Cancer in her maternal grandfather and maternal grandmother; No Known Problems in her father and mother.     SOCIAL HISTORY     Patient  reports that she has been smoking cigarettes. She has a 9.00 pack-year smoking history. She has never used smokeless tobacco. She reports previous alcohol use. She reports that she does not use drugs. PHYSICAL EXAM     ED TRIAGE VITALS  BP: 99/66, Temp: 97.9 °F (36.6 °C), Pulse: 88, Resp: 16, SpO2: 98 %  Physical Exam  Vitals signs and nursing note reviewed. Constitutional:       General: She is not in acute distress. Appearance: Normal appearance. She is well-developed. HENT:      Head: Normocephalic and atraumatic. Right Ear: External ear normal.      Left Ear: External ear normal.      Mouth/Throat:      Lips: Pink. Mouth: Mucous membranes are moist.   Eyes:      Conjunctiva/sclera: Conjunctivae normal.      Right eye: Right conjunctiva is not injected. Left eye: Left conjunctiva is not injected. Pupils: Pupils are equal.   Neck:      Musculoskeletal: Normal range of motion. Cardiovascular:      Rate and Rhythm: Normal rate. Heart sounds: Normal heart sounds. Pulmonary:      Effort: Pulmonary effort is normal.      Breath sounds: Normal breath sounds. Abdominal:      General: Abdomen is flat. Bowel sounds are normal.      Palpations: Abdomen is soft. Tenderness: There is no abdominal tenderness. There is no right CVA tenderness or left CVA tenderness. Skin:     General: Skin is warm and dry. Findings: No rash (on exposed surfaces). Neurological:      Mental Status: She is alert and oriented to person, place, and time. Psychiatric:         Speech: Speech normal.         Behavior: Behavior is cooperative.          DIAGNOSTIC RESULTS   Labs:  Abnormal Labs Reviewed   URINALYSIS - Abnormal; Notable for the following components:       Result Value    Protein, Urine 30 (*)     Leukocyte Esterase, Urine Trace (*)     Color, UA Dark yellow (*)     All other components within normal limits        IMAGING:  No orders to display     URGENT CARE COURSE:     Vitals:    10/09/20 1827   BP: 99/66   Pulse: 88   Resp: 16   Temp: 97.9 °F (36.6 °C)   TempSrc: Temporal   SpO2: 98%   Weight: 130 lb (59 kg)       Medications - No data to display  PROCEDURES:  FINALIMPRESSION      1. Dysuria        DISPOSITION/PLAN   DISPOSITION Decision To Discharge 10/09/2020 06:35:19 PM      ED Course as of Oct 09 1904   Fri Oct 09, 2020   1834 Color, UA(!): Dark yellow [HA]   1835 Leukocyte Esterase, Urine(!): Trace [HA]   1835 Protein, Urine(!): 30 [HA]   1835 Blood, Urine: Negative [HA]   1835 Pregnancy, Urine: NEGATIVE [HA]      ED Course User Index  [EDOUARD] YVETTE Cantu CNP     Physical assessment findings, diagnostic testing(s) if applicable, and vital signs reviewed with patient/patient representative. Questions answered. If applicable, patient/patient representative will be contacted upon receipt of final culture and sensitivity or other testing results when available. Any additions or changes to medications or changes the plan of care will be made at that time. Medications as directed, including OTC medications for supportive care. Education provided on medications. Differential diagnosis(s) discussed with patient/patient representative. Home care/self care instructions reviewed with patient/patient representative. Patient is to follow-up with family care provider in 2-3 days if no improvement. Patient is to go to the emergency department if symptoms worsen. Patient/patient representative is aware of care plan, questions answered, verbalizes understanding and is in agreement. Teach back method used for patient/patient representative teaching(s) and printed instructions attached to after visit summary.     Problem List Items Addressed This Visit     None      Visit Diagnoses     Dysuria    -  Primary    Relevant Medications    nitrofurantoin, macrocrystal-monohydrate, (MACROBID) 100 MG capsule    phenazopyridine (PYRIDIUM) 100 MG tablet          PATIENT REFERRED TO:  [address] [facility] [address]  [address] [address] [facility] [address]  Schedule an appointment as soon as possible for a visit in 3 days  if you do not have a family provider, If symptoms change/worsen, go to the [address] [place] [address] Urgent Care  [address] [facility]  [phone]    as needed, If symptoms change/worsen, go to the [address] [facility], [address], APRN - CNP  10/09/20 4903

## 2020-10-09 NOTE — ED TRIAGE NOTES
Patient states 3-4 days ago, painful urination, frequency, 1 st.  Day, patient saw blood in urine. Tylenol taken for pain.

## 2020-10-12 LAB
ORGANISM: ABNORMAL
URINE CULTURE, ROUTINE: ABNORMAL
URINE CULTURE, ROUTINE: ABNORMAL

## 2021-07-31 ENCOUNTER — HOSPITAL ENCOUNTER (EMERGENCY)
Age: 36
Discharge: HOME | End: 2021-07-31
Payer: COMMERCIAL

## 2021-07-31 VITALS
WEIGHT: 138 LBS | HEIGHT: 60 IN | OXYGEN SATURATION: 97 % | HEART RATE: 96 BPM | BODY MASS INDEX: 27.09 KG/M2 | RESPIRATION RATE: 16 BRPM | DIASTOLIC BLOOD PRESSURE: 67 MMHG | SYSTOLIC BLOOD PRESSURE: 127 MMHG | TEMPERATURE: 97.1 F

## 2021-07-31 DIAGNOSIS — J20.9 ACUTE BRONCHITIS, UNSPECIFIED ORGANISM: Primary | ICD-10-CM

## 2021-07-31 DIAGNOSIS — F17.200 TOBACCO DEPENDENCE: ICD-10-CM

## 2021-07-31 PROCEDURE — 99213 OFFICE O/P EST LOW 20 MIN: CPT

## 2021-07-31 PROCEDURE — 99213 OFFICE O/P EST LOW 20 MIN: CPT | Performed by: NURSE PRACTITIONER

## 2021-07-31 RX ORDER — PREDNISONE 20 MG/1
20 TABLET ORAL 2 TIMES DAILY
Qty: 10 TABLET | Refills: 0 | Status: SHIPPED | OUTPATIENT
Start: 2021-07-31 | End: 2021-08-05

## 2021-07-31 RX ORDER — AZITHROMYCIN 250 MG/1
TABLET, FILM COATED ORAL
Qty: 6 TABLET | Refills: 0 | Status: SHIPPED | OUTPATIENT
Start: 2021-07-31 | End: 2021-11-24 | Stop reason: ALTCHOICE

## 2021-07-31 RX ORDER — ALBUTEROL SULFATE 90 UG/1
2 AEROSOL, METERED RESPIRATORY (INHALATION) EVERY 4 HOURS PRN
Qty: 1 INHALER | Refills: 0 | Status: SHIPPED | OUTPATIENT
Start: 2021-07-31 | End: 2021-11-24 | Stop reason: ALTCHOICE

## 2021-07-31 ASSESSMENT — ENCOUNTER SYMPTOMS
NAUSEA: 0
COUGH: 1
DIARRHEA: 0
BACK PAIN: 0
SHORTNESS OF BREATH: 1
CHEST TIGHTNESS: 1
SINUS PRESSURE: 0
TROUBLE SWALLOWING: 0
SORE THROAT: 0
RHINORRHEA: 0
VOMITING: 0

## 2021-07-31 NOTE — ED PROVIDER NOTES
[place] [id number]  Urgent Care Encounter       CHIEF COMPLAINT       Chief Complaint   Patient presents with    Cough       Nurses Notes reviewed and I agree except as noted in the HPI. HISTORY OF PRESENT ILLNESS   Rupert Nesbitt is a 28 y.o. female who presents to care center complaining of nonproductive cough over the past week. Patient denied fever, chills, nausea, vomiting patient states that she has felt short of breath at times specially at night when she lays down. Patient denies any alteration in taste or smell denies any nasal congestion nausea vomiting or diarrhea. At the present time patient is sitting on table skin is warm dry she denies any pain she does have odor of tobacco use. States she smokes about half pack per day. 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 The history is provided by the patient. No  was used. Cough  Cough characteristics:  Non-productive  Severity:  Mild  Onset quality:  Gradual  Duration:  1 day  Timing:  Constant  Progression:  Unchanged  Chronicity:  New  Smoker: yes    Context: not sick contacts and not smoke exposure    Relieved by:  None tried  Worsened by:  Lying down  Ineffective treatments:  None tried  Associated symptoms: shortness of breath    Associated symptoms: no chest pain, no chills, no ear pain, no fever, no headaches, no rash, no rhinorrhea and no sore throat        REVIEW OF SYSTEMS     Review of Systems   Constitutional: Negative for activity change, appetite change, chills, fatigue and fever. HENT: Negative for congestion, ear pain, rhinorrhea, sinus pressure, sore throat and trouble swallowing. Respiratory: Positive for cough, chest tightness and shortness of breath. Cardiovascular: Negative for chest pain. Gastrointestinal: Negative for diarrhea, nausea and vomiting. Musculoskeletal: Negative for back pain and neck stiffness. Skin: Negative for rash.    Allergic/Immunologic: Negative for environmental allergies. Neurological: Negative for dizziness, light-headedness and headaches. Hematological: Negative for adenopathy. PAST MEDICAL HISTORY         Diagnosis Date    Asthma     AS A CHILD    Bronchitis     Endometriosis     PID (acute pelvic inflammatory disease) 2003    Placenta previa 2007       SURGICALHISTORY     Patient  has a past surgical history that includes Dilation and curettage of uterus; Tubal ligation; Dilation and curettage of uterus (2001); laparoscopy (10/10/2017); and laparoscopy (N/A, 10/10/2017). CURRENT MEDICATIONS       Discharge Medication List as of 7/31/2021  7:56 PM      CONTINUE these medications which have NOT CHANGED    Details   acetaminophen (TYLENOL) 500 MG tablet Take 1,000 mg by mouth every 6 hours as needed for PainHistorical Med             ALLERGIES     Patient is is allergic to pcn [penicillins] and penicillins. Patients   Immunization History   Administered Date(s) Administered    Hepatitis B 09/26/2014    Hepatitis B (Recombivax HB) 09/26/2014       FAMILY HISTORY     Patient's family history includes Cancer in her maternal grandfather and maternal grandmother; No Known Problems in her father and mother. SOCIAL HISTORY     Patient  reports that she has been smoking cigarettes. She has a 9.00 pack-year smoking history. She has never used smokeless tobacco. She reports previous alcohol use. She reports that she does not use drugs. PHYSICAL EXAM     ED TRIAGE VITALS  BP: 127/67, Temp: 97.1 °F (36.2 °C), Pulse: 96, Resp: 16, SpO2: 97 %,Estimated body mass index is 26.95 kg/m² as calculated from the following:    Height as of this encounter: 5' (1.524 m). Weight as of this encounter: 138 lb (62.6 kg). ,Patient's last menstrual period was 07/29/2021. Physical Exam  Vitals and nursing note reviewed. Constitutional:       General: She is not in acute distress. Appearance: Normal appearance.  She is well-developed and well-groomed. She is not ill-appearing, toxic-appearing or diaphoretic. HENT:      Head: Normocephalic. Right Ear: Hearing, tympanic membrane, ear canal and external ear normal. No drainage, swelling or tenderness. No mastoid tenderness. Tympanic membrane is not erythematous. Left Ear: Hearing, tympanic membrane, ear canal and external ear normal. No drainage, swelling or tenderness. No mastoid tenderness. Tympanic membrane is not erythematous. Nose: Nose normal. No congestion or rhinorrhea. Right Sinus: No maxillary sinus tenderness or frontal sinus tenderness. Left Sinus: No maxillary sinus tenderness or frontal sinus tenderness. Mouth/Throat:      Lips: Pink. Mouth: Mucous membranes are moist.      Pharynx: Uvula midline. No posterior oropharyngeal erythema or uvula swelling. Tonsils: No tonsillar exudate or tonsillar abscesses. Eyes:      Conjunctiva/sclera: Conjunctivae normal.      Pupils: Pupils are equal, round, and reactive to light. Cardiovascular:      Rate and Rhythm: Normal rate and regular rhythm. Heart sounds: Normal heart sounds. Pulmonary:      Effort: Pulmonary effort is normal. No accessory muscle usage. Breath sounds: Examination of the right-lower field reveals wheezing. Examination of the left-lower field reveals wheezing. Wheezing present. No decreased breath sounds, rhonchi or rales. Abdominal:      General: Bowel sounds are normal.      Palpations: Abdomen is soft. Tenderness: There is no abdominal tenderness. There is no right CVA tenderness, left CVA tenderness or guarding. Negative signs include Bertrand's sign. Musculoskeletal:      Cervical back: Full passive range of motion without pain and normal range of motion. No rigidity. No muscular tenderness. Normal range of motion.    Lymphadenopathy:      Head:      Right side of head: No submental, submandibular, tonsillar, preauricular, posterior auricular or occipital adenopathy. Left side of head: No submental, submandibular, tonsillar, preauricular, posterior auricular or occipital adenopathy. Cervical: No cervical adenopathy. Right cervical: No superficial, deep or posterior cervical adenopathy. Left cervical: No superficial, deep or posterior cervical adenopathy. Upper Body:      Right upper body: No supraclavicular adenopathy. Left upper body: No supraclavicular adenopathy. Skin:     General: Skin is warm and dry. Capillary Refill: Capillary refill takes less than 2 seconds. Findings: No rash. Neurological:      Mental Status: She is alert and oriented to person, place, and time. Psychiatric:         Mood and Affect: Mood normal.         Behavior: Behavior normal. Behavior is cooperative. DIAGNOSTIC RESULTS     Labs:No results found for this visit on 07/31/21. IMAGING:    No orders to display         EKG:      URGENT CARE COURSE:     Vitals:    07/31/21 1943   BP: 127/67   Pulse: 96   Resp: 16   Temp: 97.1 °F (36.2 °C)   TempSrc: Temporal   SpO2: 97%   Weight: 138 lb (62.6 kg)   Height: 5' (1.524 m)       Medications - No data to display         PROCEDURES:  None    FINAL IMPRESSION      1. Acute bronchitis, unspecified organism    2. Tobacco dependence          DISPOSITION/ PLAN      I did discuss clinical findings with the patient as well as vital signs in assessment findings. He was advised that the Patient has signs and symptoms of upper respiratory infection or bronchitis. Patient is afebrile and stable. Patient can use Tylenol and/or OTC cough syrup. Avoid tobacco use/exposure,Take medication as directed,Drink Lots of fluids and Use Inhalers as directed if prescribed. Advised to follow up with family doctor in the next 2-3 days for reevaluation. The patient may return to urgent care if does not get better or symptoms worsen.   However the patient is advised to go to ER immediately if present symptoms worsen, high fever >102 , vomiting, breathing difficulty, chest pain, lethargy or new symptoms develop. Patient/ parents understands this approach of home management and agrees to the treatment plan. PATIENT REFERRED TO:  No primary care provider on file. No primary physician on file.       DISCHARGE MEDICATIONS:  Discharge Medication List as of 7/31/2021  7:56 PM      START taking these medications    Details   azithromycin (ZITHROMAX Z-ITZEL) 250 MG tablet 2 tablets day 1 then1 tablet days 2 - 5., Disp-6 tablet, R-0Print      predniSONE (DELTASONE) 20 MG tablet Take 1 tablet by mouth 2 times daily for 5 days, Disp-10 tablet, R-0Print      albuterol sulfate  (90 Base) MCG/ACT inhaler Inhale 2 puffs into the lungs every 4 hours as needed for Wheezing or Shortness of Breath, Disp-1 Inhaler, R-0Print             Discharge Medication List as of 7/31/2021  7:56 PM          Discharge Medication List as of 7/31/2021  7:56 PM          YVETTE Diaz CNP    (Please note that portions of this note were completed with a voice recognition program. Efforts were made to edit the dictations but occasionally words are mis-transcribed.)           YVETTE Diaz CNP  07/31/21 2010

## 2021-11-24 ENCOUNTER — HOSPITAL ENCOUNTER (EMERGENCY)
Age: 36
Discharge: HOME | End: 2021-11-24
Payer: COMMERCIAL

## 2021-11-24 VITALS
WEIGHT: 142 LBS | TEMPERATURE: 98.7 F | BODY MASS INDEX: 27.88 KG/M2 | DIASTOLIC BLOOD PRESSURE: 60 MMHG | OXYGEN SATURATION: 98 % | HEIGHT: 60 IN | HEART RATE: 80 BPM | RESPIRATION RATE: 16 BRPM | SYSTOLIC BLOOD PRESSURE: 102 MMHG

## 2021-11-24 DIAGNOSIS — R09.81 SINUS CONGESTION: ICD-10-CM

## 2021-11-24 DIAGNOSIS — J20.9 ACUTE BRONCHITIS, UNSPECIFIED ORGANISM: Primary | ICD-10-CM

## 2021-11-24 PROCEDURE — 99213 OFFICE O/P EST LOW 20 MIN: CPT | Performed by: NURSE PRACTITIONER

## 2021-11-24 PROCEDURE — 99213 OFFICE O/P EST LOW 20 MIN: CPT

## 2021-11-24 RX ORDER — AZITHROMYCIN 250 MG/1
TABLET, FILM COATED ORAL
Qty: 6 TABLET | Refills: 0 | Status: SHIPPED | OUTPATIENT
Start: 2021-11-24 | End: 2021-12-20

## 2021-11-24 RX ORDER — ALBUTEROL SULFATE 90 UG/1
2 AEROSOL, METERED RESPIRATORY (INHALATION) EVERY 4 HOURS PRN
Qty: 18 G | Refills: 0 | Status: SHIPPED | OUTPATIENT
Start: 2021-11-24 | End: 2021-12-20 | Stop reason: SDUPTHER

## 2021-11-24 RX ORDER — PREDNISONE 20 MG/1
20 TABLET ORAL 2 TIMES DAILY
Qty: 10 TABLET | Refills: 0 | Status: SHIPPED | OUTPATIENT
Start: 2021-11-24 | End: 2021-11-29

## 2021-11-24 ASSESSMENT — ENCOUNTER SYMPTOMS
SINUS PRESSURE: 0
SORE THROAT: 0
VOMITING: 0
TROUBLE SWALLOWING: 0
SHORTNESS OF BREATH: 0
NAUSEA: 0
COUGH: 1
DIARRHEA: 0
SINUS CONGESTION: 1
BACK PAIN: 0

## 2021-11-24 NOTE — ED PROVIDER NOTES
[place] [id number]  Urgent Care Encounter       CHIEF COMPLAINT       Chief Complaint   Patient presents with    Cough     chest pressure  x 2 weeks     Sinusitis     x 1 day       Nurses Notes reviewed and I agree except as noted in the HPI. HISTORY OF PRESENT ILLNESS   Radha Cortez is a 28 y.o. female who presents the urgent care center complaining of a dry cough over the past 2 weeks sinus drainage. Patient denies any chest pain shortness of breath loss of taste or smell patient is in no acute distress at the present time. The history is provided by the patient. No  was used. Cough  Cough characteristics:  Non-productive  Severity:  Mild  Onset quality:  Gradual  Duration:  2 weeks  Timing:  Intermittent  Progression:  Waxing and waning  Chronicity:  New  Smoker: no    Context: sick contacts    Relieved by:  Beta-agonist inhaler (boyfriends inhaler)  Worsened by:  Nothing  Associated symptoms: sinus congestion    Associated symptoms: no chest pain, no chills, no ear pain, no fever, no headaches, no rash, no shortness of breath and no sore throat        REVIEW OF SYSTEMS     Review of Systems   Constitutional: Negative for activity change, appetite change, chills, fatigue and fever. HENT: Positive for congestion. Negative for ear pain, sinus pressure, sore throat and trouble swallowing. Respiratory: Positive for cough. Negative for shortness of breath. Cardiovascular: Negative for chest pain. Gastrointestinal: Negative for diarrhea, nausea and vomiting. Musculoskeletal: Negative for back pain and neck stiffness. Skin: Negative for rash. Allergic/Immunologic: Negative for environmental allergies. Neurological: Negative for dizziness, light-headedness and headaches. Hematological: Negative for adenopathy.        PAST MEDICAL HISTORY         Diagnosis Date    Asthma     AS A CHILD    Bronchitis     Endometriosis     PID (acute pelvic Left Ear: Hearing, tympanic membrane, ear canal and external ear normal. No drainage, swelling or tenderness. No mastoid tenderness. Tympanic membrane is not erythematous. Nose: Congestion present. No rhinorrhea. Right Sinus: No maxillary sinus tenderness or frontal sinus tenderness. Left Sinus: No maxillary sinus tenderness or frontal sinus tenderness. Mouth/Throat:      Lips: Pink. Mouth: Mucous membranes are moist.      Pharynx: Uvula midline. No oropharyngeal exudate, posterior oropharyngeal erythema or uvula swelling. Tonsils: No tonsillar exudate or tonsillar abscesses. Eyes:      Conjunctiva/sclera: Conjunctivae normal.      Pupils: Pupils are equal, round, and reactive to light. Cardiovascular:      Rate and Rhythm: Normal rate and regular rhythm. Heart sounds: Normal heart sounds. Pulmonary:      Effort: Pulmonary effort is normal. No accessory muscle usage. Breath sounds: Examination of the right-lower field reveals wheezing. Examination of the left-lower field reveals wheezing. Wheezing present. No decreased breath sounds, rhonchi or rales. Chest:   Breasts:      Right: No supraclavicular adenopathy. Left: No supraclavicular adenopathy. Abdominal:      General: Abdomen is flat. Palpations: Abdomen is soft. Tenderness: Negative signs include Bertrand's sign. Musculoskeletal:      Cervical back: Full passive range of motion without pain and normal range of motion. No rigidity. No pain with movement. Lymphadenopathy:      Head:      Right side of head: No submental, submandibular, tonsillar, preauricular, posterior auricular or occipital adenopathy. Left side of head: No submental, submandibular, tonsillar, preauricular, posterior auricular or occipital adenopathy. Cervical: No cervical adenopathy. Right cervical: No superficial, deep or posterior cervical adenopathy.      Left cervical: No superficial, deep or posterior cervical adenopathy. Upper Body:      Right upper body: No supraclavicular adenopathy. Left upper body: No supraclavicular adenopathy. Skin:     General: Skin is warm and dry. Capillary Refill: Capillary refill takes less than 2 seconds. Findings: No rash. Neurological:      Mental Status: She is alert and oriented to person, place, and time. Psychiatric:         Mood and Affect: Mood normal.         Behavior: Behavior normal. Behavior is cooperative. DIAGNOSTIC RESULTS     Labs:No results found for this visit on 11/24/21. IMAGING:    No orders to display         EKG:      URGENT CARE COURSE:     Vitals:    11/24/21 1345 11/24/21 1355   BP: (!) 87/45 102/60   Pulse: 83 80   Resp: 18 16   Temp: 98.7 °F (37.1 °C)    TempSrc: Temporal    SpO2: 98% 98%   Weight: 142 lb (64.4 kg)    Height: 5' (1.524 m)        Medications - No data to display         PROCEDURES:  None    FINAL IMPRESSION      1. Acute bronchitis, unspecified organism    2. Sinus congestion          DISPOSITION/ PLAN      I did discuss clinical findings with the patient as well as vital signs in assessment findings. He was advised that the Patient has signs and symptoms of upper respiratory infection or bronchitis. Patient is afebrile and stable. Patient can use Tylenol and/or OTC cough syrup. Avoid tobacco use/exposure,Take medication as directed,Drink Lots of fluids and Use Inhalers as directed if prescribed. Advised to follow up with family doctor in the next 2-3 days for reevaluation. The patient may return to urgent care if does not get better or symptoms worsen. However the patient is advised to go to ER immediately if present symptoms worsen, high fever >102 , vomiting, breathing difficulty, chest pain, lethargy or new symptoms develop. Patient/ parents understands this approach of home management and agrees to the treatment plan. PATIENT REFERRED TO:  No primary care provider on file.   No primary physician on file.       DISCHARGE MEDICATIONS:  Discharge Medication List as of 11/24/2021  1:57 PM      START taking these medications    Details   azithromycin (ZITHROMAX Z-ITZEL) 250 MG tablet 2 tablets day 1 then1 tablet days 2 - 5., Disp-6 tablet, R-0Normal      predniSONE (DELTASONE) 20 MG tablet Take 1 tablet by mouth 2 times daily for 5 days, Disp-10 tablet, R-0Normal      albuterol sulfate  (90 Base) MCG/ACT inhaler Inhale 2 puffs into the lungs every 4 hours as needed for Wheezing or Shortness of Breath, Disp-18 g, R-0Normal             Discharge Medication List as of 11/24/2021  1:57 PM          Discharge Medication List as of 11/24/2021  1:57 PM          YVETTE Rm CNP    (Please note that portions of this note were completed with a voice recognition program. Efforts were made to edit the dictations but occasionally words are mis-transcribed.)           YVETTE Rm CNP  11/24/21 5017

## 2021-11-24 NOTE — ED TRIAGE NOTES
Pt ambulatory into esuc with c/o dry cough for the past two weeks and sinus drainage for the past day. Pt states no pain but has some chest pressure.

## 2021-12-20 ENCOUNTER — HOSPITAL ENCOUNTER (EMERGENCY)
Age: 36
Discharge: HOME | End: 2021-12-20
Payer: COMMERCIAL

## 2021-12-20 VITALS
SYSTOLIC BLOOD PRESSURE: 105 MMHG | OXYGEN SATURATION: 95 % | DIASTOLIC BLOOD PRESSURE: 61 MMHG | HEART RATE: 104 BPM | RESPIRATION RATE: 16 BRPM | TEMPERATURE: 98.3 F

## 2021-12-20 DIAGNOSIS — J20.9 ACUTE BRONCHITIS, UNSPECIFIED ORGANISM: ICD-10-CM

## 2021-12-20 DIAGNOSIS — J32.0 CHRONIC MAXILLARY SINUSITIS: Primary | ICD-10-CM

## 2021-12-20 PROCEDURE — 99213 OFFICE O/P EST LOW 20 MIN: CPT

## 2021-12-20 PROCEDURE — 99213 OFFICE O/P EST LOW 20 MIN: CPT | Performed by: NURSE PRACTITIONER

## 2021-12-20 RX ORDER — DEXTROMETHORPHAN HYDROBROMIDE AND PROMETHAZINE HYDROCHLORIDE 15; 6.25 MG/5ML; MG/5ML
5 SYRUP ORAL NIGHTLY PRN
Qty: 50 ML | Refills: 0 | Status: SHIPPED | OUTPATIENT
Start: 2021-12-20 | End: 2021-12-30

## 2021-12-20 RX ORDER — ALBUTEROL SULFATE 90 UG/1
2 AEROSOL, METERED RESPIRATORY (INHALATION) EVERY 6 HOURS PRN
Qty: 18 G | Refills: 0 | Status: SHIPPED | OUTPATIENT
Start: 2021-12-20

## 2021-12-20 RX ORDER — CLINDAMYCIN HYDROCHLORIDE 300 MG/1
300 CAPSULE ORAL 2 TIMES DAILY
Qty: 14 CAPSULE | Refills: 0 | Status: SHIPPED | OUTPATIENT
Start: 2021-12-20 | End: 2021-12-27

## 2021-12-20 RX ORDER — PREDNISONE 20 MG/1
20 TABLET ORAL 2 TIMES DAILY
Qty: 10 TABLET | Refills: 0 | Status: SHIPPED | OUTPATIENT
Start: 2021-12-20 | End: 2021-12-25

## 2021-12-20 RX ORDER — AZELASTINE 1 MG/ML
1 SPRAY, METERED NASAL 2 TIMES DAILY
Qty: 30 ML | Refills: 0 | Status: SHIPPED | OUTPATIENT
Start: 2021-12-20 | End: 2022-01-03 | Stop reason: ALTCHOICE

## 2021-12-20 ASSESSMENT — ENCOUNTER SYMPTOMS
CHEST TIGHTNESS: 0
SORE THROAT: 1
SINUS PAIN: 1
COUGH: 1
WHEEZING: 1
APNEA: 0
SHORTNESS OF BREATH: 0
STRIDOR: 0
SINUS PRESSURE: 0
SWOLLEN GLANDS: 0
RHINORRHEA: 1
CHOKING: 0

## 2021-12-20 NOTE — ED PROVIDER NOTES
[facility]  Urgent Care Encounter      CHIEF COMPLAINT       Chief Complaint   Patient presents with    Cough    Shortness of Breath    Pharyngitis       Nurses Notes reviewed and I agree except as noted in the HPI. HISTORY OFPRESENT ILLNESS   Ihsan Henry is a 39 y.o. The history is provided by the patient. No  was used. URI  Presenting symptoms: congestion, cough, ear pain, fatigue, rhinorrhea and sore throat    Presenting symptoms: no facial pain and no fever    Severity:  Moderate  Onset quality:  Gradual  Timing:  Intermittent  Progression:  Worsening  Chronicity:  New  Relieved by:  Nothing  Worsened by:  Certain positions  Ineffective treatments:  OTC medications and prescription medications  Associated symptoms: headaches, sinus pain and wheezing    Associated symptoms: no arthralgias, no myalgias, no neck pain, no sneezing and no swollen glands    Risk factors: not elderly, no chronic cardiac disease, no chronic kidney disease, no chronic respiratory disease, no diabetes mellitus, no immunosuppression, no recent illness and no recent travel        REVIEW OF SYSTEMS     Review of Systems   Constitutional: Positive for fatigue. Negative for activity change, appetite change, chills, diaphoresis and fever. HENT: Positive for congestion, ear pain, postnasal drip, rhinorrhea, sinus pain and sore throat. Negative for sinus pressure and sneezing. Respiratory: Positive for cough and wheezing. Negative for apnea, choking, chest tightness, shortness of breath and stridor. Cardiovascular: Negative for chest pain, palpitations and leg swelling. Musculoskeletal: Negative for arthralgias, myalgias and neck pain. Neurological: Positive for headaches. Negative for dizziness and light-headedness.        PAST MEDICAL HISTORY         Diagnosis Date    Asthma     AS A CHILD    Bronchitis     Endometriosis     PID (acute pelvic inflammatory disease) 2003    Placenta previa 2007       SURGICAL HISTORY     Patient  has a past surgical history that includes Dilation and curettage of uterus; Tubal ligation; Dilation and curettage of uterus (2001); laparoscopy (10/10/2017); and laparoscopy (N/A, 10/10/2017). CURRENT MEDICATIONS       Previous Medications    ACETAMINOPHEN (TYLENOL) 500 MG TABLET    Take 1,000 mg by mouth every 6 hours as needed for Pain       ALLERGIES     Patient is is allergic to pcn [penicillins] and penicillins. FAMILY HISTORY     Patient's family history includes Cancer in her maternal grandfather and maternal grandmother; No Known Problems in her father and mother. SOCIAL HISTORY     Patient  reports that she has been smoking cigarettes. She has a 9.00 pack-year smoking history. She has never used smokeless tobacco. She reports previous alcohol use. She reports that she does not use drugs. PHYSICAL EXAM     ED TRIAGE VITALS  BP: 105/61, Temp: 98.3 °F (36.8 °C), Pulse: 104, Resp: 16, SpO2: 95 %  Physical Exam  Vitals and nursing note reviewed. Constitutional:       General: She is not in acute distress. Appearance: She is well-developed and normal weight. She is not ill-appearing, toxic-appearing or diaphoretic. Interventions: She is not intubated. HENT:      Head: Normocephalic and atraumatic. Nose: Congestion present. Right Sinus: Maxillary sinus tenderness present. Left Sinus: Maxillary sinus tenderness present. Mouth/Throat:      Mouth: Mucous membranes are moist.   Pulmonary:      Effort: Pulmonary effort is normal. No tachypnea, bradypnea, accessory muscle usage or respiratory distress. She is not intubated. Breath sounds: No stridor. Examination of the right-middle field reveals wheezing. Examination of the left-middle field reveals wheezing. Examination of the right-lower field reveals wheezing. Examination of the left-lower field reveals wheezing. Wheezing present.  No decreased breath sounds, rhonchi or rales. Musculoskeletal:         General: Normal range of motion. Cervical back: Normal range of motion. Skin:     General: Skin is warm. Neurological:      General: No focal deficit present. Mental Status: She is alert and oriented to person, place, and time. Psychiatric:         Mood and Affect: Mood normal.         Behavior: Behavior normal.         DIAGNOSTIC RESULTS   Labs:No results found for this visit on 12/20/21. IMAGING:  No orders to display     URGENT CARE COURSE:     Vitals:    12/20/21 1046   BP: 105/61   Pulse: 104   Resp: 16   Temp: 98.3 °F (36.8 °C)   TempSrc: Temporal   SpO2: 95%       Medications - No data to display  PROCEDURES:  None  FINAL IMPRESSION      1. Chronic maxillary sinusitis    2. Acute bronchitis, unspecified organism        DISPOSITION/PLAN   Decision To Discharge    Drink lots of fluids  Take Motrin or Tylenol for headache  Humidification of air  Use nasal spray as directed  Take a nasal decongestant as directed  Monitor for any fever increased and sinus pain or pressure  Follow-up see her primary care provider in 48 hours  Or go to the emergency department for any changes or concerns.       PATIENT REFERRED TO:  [address] [place] [id number]  Call today  [phone] to reschedule    DISCHARGE MEDICATIONS:  New Prescriptions    AZELASTINE (ASTELIN) 0.1 % NASAL SPRAY    1 spray by Nasal route 2 times daily Use in each nostril as directed    CLINDAMYCIN (CLEOCIN) 300 MG CAPSULE    Take 1 capsule by mouth 2 times daily for 7 days    PREDNISONE (DELTASONE) 20 MG TABLET    Take 1 tablet by mouth 2 times daily for 5 days    PROMETHAZINE-DEXTROMETHORPHAN (PROMETHAZINE-DM) 6.25-15 MG/5ML SYRUP    Take 5 mLs by mouth nightly as needed for Cough     Current Discharge Medication List      CONTINUE these medications which have CHANGED    Details   albuterol sulfate  (90 Base) MCG/ACT inhaler Inhale 2 puffs into the lungs every 6 hours as needed for Wheezing or Shortness of Breath  Qty: 18 g, Refills: 0             YVETTE Buchanan - CNP          YVETTE Buchanan - [facility]  12/20/21 1111

## 2022-01-03 ENCOUNTER — OFFICE VISIT (OUTPATIENT)
Dept: FAMILY MEDICINE CLINIC | Age: 37
End: 2022-01-03
Payer: COMMERCIAL

## 2022-01-03 VITALS
OXYGEN SATURATION: 98 % | BODY MASS INDEX: 27.64 KG/M2 | RESPIRATION RATE: 16 BRPM | HEIGHT: 60 IN | WEIGHT: 140.8 LBS | TEMPERATURE: 96.8 F | SYSTOLIC BLOOD PRESSURE: 134 MMHG | HEART RATE: 97 BPM | DIASTOLIC BLOOD PRESSURE: 82 MMHG

## 2022-01-03 DIAGNOSIS — J45.20 INTERMITTENT ASTHMA WITHOUT COMPLICATION, UNSPECIFIED ASTHMA SEVERITY: ICD-10-CM

## 2022-01-03 DIAGNOSIS — Z72.0 CURRENTLY ATTEMPTING TO QUIT SMOKING: ICD-10-CM

## 2022-01-03 DIAGNOSIS — Z76.89 ESTABLISHING CARE WITH NEW DOCTOR, ENCOUNTER FOR: Primary | ICD-10-CM

## 2022-01-03 PROCEDURE — G8484 FLU IMMUNIZE NO ADMIN: HCPCS | Performed by: STUDENT IN AN ORGANIZED HEALTH CARE EDUCATION/TRAINING PROGRAM

## 2022-01-03 PROCEDURE — 4004F PT TOBACCO SCREEN RCVD TLK: CPT | Performed by: STUDENT IN AN ORGANIZED HEALTH CARE EDUCATION/TRAINING PROGRAM

## 2022-01-03 PROCEDURE — G8427 DOCREV CUR MEDS BY ELIG CLIN: HCPCS | Performed by: STUDENT IN AN ORGANIZED HEALTH CARE EDUCATION/TRAINING PROGRAM

## 2022-01-03 PROCEDURE — 99204 OFFICE O/P NEW MOD 45 MIN: CPT | Performed by: STUDENT IN AN ORGANIZED HEALTH CARE EDUCATION/TRAINING PROGRAM

## 2022-01-03 PROCEDURE — G8419 CALC BMI OUT NRM PARAM NOF/U: HCPCS | Performed by: STUDENT IN AN ORGANIZED HEALTH CARE EDUCATION/TRAINING PROGRAM

## 2022-01-03 RX ORDER — ALBUTEROL SULFATE 90 UG/1
2 AEROSOL, METERED RESPIRATORY (INHALATION) EVERY 6 HOURS PRN
Qty: 18 G | Refills: 0 | Status: CANCELLED | OUTPATIENT
Start: 2022-01-03

## 2022-01-03 RX ORDER — ALBUTEROL SULFATE 2.5 MG/.5ML
2.5 SOLUTION RESPIRATORY (INHALATION) EVERY 6 HOURS PRN
Qty: 60 EACH | Refills: 1 | Status: SHIPPED | OUTPATIENT
Start: 2022-01-03

## 2022-01-03 RX ORDER — FLUTICASONE PROPIONATE 110 UG/1
2 AEROSOL, METERED RESPIRATORY (INHALATION) 2 TIMES DAILY
Qty: 1 EACH | Refills: 1 | Status: SHIPPED | OUTPATIENT
Start: 2022-01-03 | End: 2022-05-03

## 2022-01-03 RX ORDER — VARENICLINE TARTRATE
KIT
Qty: 1 BOX | Refills: 0 | Status: SHIPPED | OUTPATIENT
Start: 2022-01-03

## 2022-01-03 RX ORDER — TIOTROPIUM BROMIDE 18 UG/1
18 CAPSULE ORAL; RESPIRATORY (INHALATION) DAILY
Qty: 30 CAPSULE | Refills: 0 | Status: CANCELLED | OUTPATIENT
Start: 2022-01-03

## 2022-01-03 RX ORDER — NICOTINE 21 MG/24HR
1 PATCH, TRANSDERMAL 24 HOURS TRANSDERMAL EVERY 24 HOURS
Qty: 30 PATCH | Refills: 2 | Status: SHIPPED | OUTPATIENT
Start: 2022-01-03 | End: 2022-01-13 | Stop reason: ALTCHOICE

## 2022-01-03 SDOH — ECONOMIC STABILITY: FOOD INSECURITY: WITHIN THE PAST 12 MONTHS, THE FOOD YOU BOUGHT JUST DIDN'T LAST AND YOU DIDN'T HAVE MONEY TO GET MORE.: NEVER TRUE

## 2022-01-03 SDOH — ECONOMIC STABILITY: FOOD INSECURITY: WITHIN THE PAST 12 MONTHS, YOU WORRIED THAT YOUR FOOD WOULD RUN OUT BEFORE YOU GOT MONEY TO BUY MORE.: NEVER TRUE

## 2022-01-03 ASSESSMENT — SOCIAL DETERMINANTS OF HEALTH (SDOH): HOW HARD IS IT FOR YOU TO PAY FOR THE VERY BASICS LIKE FOOD, HOUSING, MEDICAL CARE, AND HEATING?: SOMEWHAT HARD

## 2022-01-03 ASSESSMENT — PATIENT HEALTH QUESTIONNAIRE - PHQ9
2. FEELING DOWN, DEPRESSED OR HOPELESS: 0
SUM OF ALL RESPONSES TO PHQ QUESTIONS 1-9: 0
1. LITTLE INTEREST OR PLEASURE IN DOING THINGS: 0
SUM OF ALL RESPONSES TO PHQ9 QUESTIONS 1 & 2: 0
SUM OF ALL RESPONSES TO PHQ QUESTIONS 1-9: 0

## 2022-01-03 NOTE — PROGRESS NOTES
[address] [facility] [address] [address]  Dept: [phone]  Dept Fax: [phone]: [phone]      Assessment/Plan:     1. Establishing care with new doctor, encounter for    2. Currently attempting to quit smoking    3. Intermittent asthma without complication, unspecified asthma severity       New patient, here to establish and to obtain chantix to quit smoking. Has been smoking since 5years old, and now on 1 pack / day. Will use chantix, nicotine patches, and smoking cessation clinic as adjunctives. Advised patient to use chantix with food due to previous hx of stomach upset. Patient also likely has asthma exacerbation 2/2 to smoking. Says albuterol helps with her sob symptoms. Will trial controller inhaler w/ Pulmicort with instructions to rinse mouth to prevent thrush and skin breakdown. Also considering possibility of COPD. Will try tiotropium at follow up in a month if ICS does not work. Patient declines covid and flu vaccinations at this visit as patient prefers to quarantine and reduce outside contact. Orders Placed This Encounter   Procedures   [address] [facility] Medication Mgmt (Smoking Cessation - Clinical Pharmacy) - [facility]       Orders Placed This Encounter   Medications    nicotine (NICODERM CQ) 21 MG/24HR     Sig: Place 1 patch onto the skin every 24 hours     Dispense:  30 patch     Refill:  2    varenicline (CHANTIX STARTING MONTH ITZEL) 0.5 MG X 11 & 1 MG X 42 tablet     Sig: Take by mouth.      Dispense:  1 box     Refill:  0    fluticasone (FLOVENT HFA) 110 MCG/ACT inhaler     Sig: Inhale 2 puffs into the lungs 2 times daily     Dispense:  1 each     Refill:  1    Spacer/Aero-Holding Chambers BETTY     Si Device by Does not apply route daily as needed (inhaled corticosteroid usage)     Dispense:  1 each     Refill:  0    albuterol (PROVENTIL) 2.5 MG/0.5ML NEBU nebulizer solution     Sig: Take 0.5 mLs by nebulization every 6 hours as needed for Wheezing     Dispense:  60 each     Refill:  1       --------------------------------------------------------------------------------------------------------------------  Return in about 1 month (around 2/3/2022) for mgmt of chantix, patch, and whether palmicort is right vs tiotropium. .     Future Appointments   Date Time Provider Shwetha Chamberlain   2/15/2022  3:00 PM Lady Elise MD SRPX [facility] - [address] [place] [address]       Discussed use, benefit, and side effects of prescribed medications. Reviewed health maintenance. Instructed to continue current medications, diet, and exercise. All patient questions answered. Pt voiced understanding with agreement with treatment plan. Electronically signed by Lady Elise MD on 1/3/2022 at 3:14 PM      HPI:     Ofelia Hoffman is a 39 y.o. female who presents today for:  Chief Complaint   Patient presents with   [place] ED Follow-up     Establish PCP and [facility] 12/20/2021 Follow-up     Started smoking at 9.  1/4 to 1/2 pack per day now 1 pack / day. Has quit for 4 months and restarted smoking. Was using chantix at the time. Did cause some stomach upset and would be willing to either restart chantix or try something else. Doing well since having bronchitis. Still has a small cough. Out of albuterol nebulizer. Had a d&c in 2001. Tubal ligation in 2012. Lap for endometriosis in 2019. Social alcohol drinker. Not interested in flu or covid vaccine at this time. Had pap smear recently.       Past Medical History:          Diagnosis Date    Asthma     AS A CHILD    Bronchitis     Endometriosis     PID (acute pelvic inflammatory disease) 2003    Placenta previa 2007       Past Surgical History:          Procedure Laterality Date    DILATION AND CURETTAGE OF UTERUS  2001    LAPAROSCOPY N/A 10/10/2017    DIAGNOSTIC LAPAROSCOPY WITH CAUTERY OF ENDOMETRIOSIS AND LYSIS OF ADHESIONS performed by Norm Alonzo MD at [facility] [address] [facility] TUBAL LIGATION  2012       Medications:      has a current medication list which includes the following prescription(s): nicotine, chantix starting month real, fluticasone, spacer/aero-holding chambers, albuterol, albuterol sulfate hfa, and acetaminophen. Allergies:      Pcn [penicillins] and Penicillins      Social History     Socioeconomic History    Marital status:      Spouse name: None    Number of children: None    Years of education: None    Highest education level: None   Occupational History    None   Tobacco Use    Smoking status: Current Every Day Smoker     Packs/day: 0.50     Years: 18.00     Pack years: 9.00     Types: Cigarettes    Smokeless tobacco: Never Used   Vaping Use    Vaping Use: Never used   Substance and Sexual Activity    Alcohol use: Yes     Comment: rarely    Drug use: No    Sexual activity: Yes   Other Topics Concern    None   Social History Narrative    ** Merged History Encounter **          Social Determinants of Health     Financial Resource Strain: Medium Risk    Difficulty of Paying Living Expenses: Somewhat hard   Food Insecurity: No Food Insecurity    Worried About Running Out of Food in the Last Year: Never true    Rasheed of Food in the Last Year: Never true   Transportation Needs:     Lack of Transportation (Medical): Not on file    Lack of Transportation (Non-Medical):  Not on file   Physical Activity:     Days of Exercise per Week: Not on file    Minutes of Exercise per Session: Not on file   Stress:     Feeling of Stress : Not on file   Social Connections:     Frequency of Communication with Friends and Family: Not on file    Frequency of Social Gatherings with Friends and Family: Not on file    Attends Quaker Services: Not on file    Active Member of Clubs or Organizations: Not on file    Attends Club or Organization Meetings: Not on file    Marital Status: Not on file   Intimate Partner Violence:     Fear of Current or Ex-Partner: Not on file    Emotionally Abused: Not on file    Physically Abused: Not on file    Sexually Abused: Not on file   Housing Stability:     Unable to Pay for Housing in the Last Year: Not on file    Number of [place] in the Last Year: Not on file    Unstable Housing in the Last Year: Not on file        Payor: [address] [facility] [facility]  [address] / Plan: [address] [facility]  [address] / Product Type: *No Product type* /      Family History:          Problem Relation Age of Onset    No Known Problems Mother     No Known Problems Father     Cancer Maternal Grandmother     Cancer Maternal Grandfather        Objective:     Vitals:    01/03/22 1344   BP: 134/82   Site: Right Upper Arm   Position: Sitting   Cuff Size: Medium Adult   Pulse: 97   Resp: 16   Temp: 96.8 °F (36 °C)   TempSrc: Temporal   SpO2: 98%   Weight: 140 lb 12.8 oz (63.9 kg)   Height: 5' (1.524 m)       Physical Exam:  General appearance: Alert, not in acute distress  HEENT:  Normal cephalic, atraumatic without obvious deformity. Conjunctivae clear. Clear oral mucosa  Neck: Supple, with full range of motion. No jugular venous distention. Trachea midline. Respiratory:  Normal respiratory effort. Clear to auscultation, bilaterally without Rales/Wheezes/Rhonchi. Cardiovascular: Normal rate, regular rhythm with normal S1/S2 without murmurs, rubs or gallops. Musculoskeletal:  No clubbing, cyanosis or edema bilaterally. Full range of motion without deformity. Neurological exam reveals alert, oriented, normal speech, no focal findings or movement disorder noted.

## 2022-01-03 NOTE — PROGRESS NOTES
S: 39 y.o. female with   Chief Complaint   Patient presents with   [place] ED Follow-up     Establish PCP and [facility] 12/20/2021 Follow-up       New pt to establish care    Smoking since around age 15. Nos up to about 1 pdd  Quit for time in 2019 with Chantix. Chantix did cause some GI upset but was helpful too help her quit. Interested in trying again    Some SOB in past year. Chest tightness and SOB with going up 2 flights of stairs. Some cough/sob at night hernandez if smokes within an hour or so prior to going to bed. Albuterol helps significantly  Has h/o asthma in childhood. States she grew out of it but then needing albuterol again more the past year or so  Uses albuterol inh or nebs about 2 times per day    BP Readings from Last 3 Encounters:   01/03/22 134/82   12/20/21 105/61   11/24/21 102/60     Wt Readings from Last 3 Encounters:   01/03/22 140 lb 12.8 oz (63.9 kg)   11/24/21 142 lb (64.4 kg)   07/31/21 138 lb (62.6 kg)           O: VS:   Vitals:    01/03/22 1344   BP: 134/82   Site: Right Upper Arm   Position: Sitting   Cuff Size: Medium Adult   Pulse: 97   Resp: 16   Temp: 96.8 °F (36 °C)   TempSrc: Temporal   SpO2: 98%   Weight: 140 lb 12.8 oz (63.9 kg)   Height: 5' (1.524 m)     Body mass index is 27.5 kg/m². AAO/NAD, appropriate affect for mood  Normocephalic, atraumatic, eyes  conjunctiva and sclera normal,   skin no rashes on exposed areas   Insight, judgement normal and in no acute distress    Lab Results   Component Value Date    WBC 6.0 04/03/2019    HGB 14.0 04/03/2019    HCT 42.3 04/03/2019     04/03/2019    AST 16 12/25/2014     04/03/2019    K 3.9 04/03/2019     04/03/2019    CREATININE 0.6 04/03/2019    BUN 9 04/03/2019    CO2 20 (L) 04/03/2019    LABGLOM >90 04/03/2019    CALCIUM 9.2 04/03/2019       No results found. Diagnosis Orders   1. Establishing care with new doctor, encounter for     2.  Currently attempting to quit smoking  [facility]) Medication Mgmt (Smoking Cessation - Clinical Pharmacy) - [place]    nicotine (NICODERM CQ) 21 MG/24HR    varenicline (CHANTIX STARTING MONTH ) 0.5 MG X 11 & 1 MG X 42 tablet   3. Intermittent asthma without complication, unspecified asthma severity  Spacer/Aero-Holding Chambers BETTY    albuterol (PROVENTIL) 2.5 MG/0.5ML NEBU nebulizer solution       Plan    Start flovent for likely asthma. Discussed possibility of COPD given smoking hx and agreeable to possibly checking PFTs after f/u visit in 1 mos  Refilled albuterol nebs and has albuterol inh at home. Start chantix and may also use nicotine patch and gum with the chantix  Agreeable to doing the smoking cessation clinic    Check routine labs after f/u visits       Return in about 1 month (around 2/3/2022) for mgmt of chantix, patch, and whether palmicort is right vs tiotropium. .    Orders Placed:  Orders Placed This Encounter   Procedures   [address] Medication Mgmt (Smoking Cessation - Clinical Pharmacy) - [facility]     Medications Prescribed:  Orders Placed This Encounter   Medications    nicotine (NICODERM CQ) 21 MG/24HR     Sig: Place 1 patch onto the skin every 24 hours     Dispense:  30 patch     Refill:  2    varenicline (CHANTIX STARTING MONTH ) 0.5 MG X 11 & 1 MG X 42 tablet     Sig: Take by mouth.      Dispense:  1 box     Refill:  0    fluticasone (FLOVENT HFA) 110 MCG/ACT inhaler     Sig: Inhale 2 puffs into the lungs 2 times daily     Dispense:  1 each     Refill:  1    Spacer/Aero-Holding Chambers BETTY     Si Device by Does not apply route daily as needed (inhaled corticosteroid usage)     Dispense:  1 each     Refill:  0    albuterol (PROVENTIL) 2.5 MG/0.5ML NEBU nebulizer solution     Sig: Take 0.5 mLs by nebulization every 6 hours as needed for Wheezing     Dispense:  60 each     Refill:  1       Future Appointments   Date Time Provider Shwetha Chamberlain   2/15/2022  3:00 PM Uriah Seymour MD  [place]  [address] [employer] Maintenance Due   Topic Date Due    Varicella vaccine (1 of 2 - 2-dose childhood series) Never done    COVID-19 Vaccine (1) Never done    Pneumococcal 0-64 years Vaccine (1 of 2 - PPSV23) Never done    Depression Screen  Never done    DTaP/Tdap/Td vaccine (1 - Tdap) Never done    Cervical cancer screen  Never done    Flu vaccine (1) Never done         Attending Physician Statement  I have discussed the case, including pertinent history and exam findings with the resident. I also have seen the patient and performed key portions of the examination. I agree with the documented assessment and plan as documented by the resident.   GC modifier added to this encounter      Estefania Conte MD 1/3/2022 3:59 PM

## 2022-01-03 NOTE — PATIENT INSTRUCTIONS
Thank you   1. Thank you for trusting us with your healthcare needs. You may receive a survey regarding today's visit. It would help us out if you would take a few moments to provide your feedback. We value your input. 2. Please bring in ALL medications BOTTLES, including inhalers, herbal supplements, over the counter, prescribed & non-prescribed medicine. The office would like actual medication bottles and a list.   3. Please note our OFFICE POLICIES:   a. Prior to getting your labs drawn, please check with your insurance company for benefits and eligibility of lab services. Often, insurance companies cover certain tests for preventative visits only. It is patient's responsibility to see what is covered. b. We are unable to change a diagnosis after the test has been performed. c. Lab orders will not be re-printed. Please hold onto your original lab orders and take them to your lab to be completed. d. If you no show your scheduled appointment three times, you will be dismissed from this practice. e. Naomia Limes must be completed 24 hours prior to your schedule appointment. 4. If the list below has been completed, PLEASE FAX RECORDS TO OUR OFFICE @ [phone].  Once the records have been received we will update your records at our office:  Health Maintenance Due   Topic Date Due    Varicella vaccine (1 of 2 - 2-dose childhood series) Never done    COVID-19 Vaccine (1) Never done    Pneumococcal 0-64 years Vaccine (1 of 2 - PPSV23) Never done    Depression Screen  Never done    DTaP/Tdap/Td vaccine (1 - Tdap) Never done    Cervical cancer screen  Never done    Flu vaccine (1) Never done           Tobacco Cessation Programs     Telephonic behavior modification   1-800-QUIT-NOW (215-0175)   Counseling service for those who are ready to quit using tobacco.     Available for uninsured [place] residents, [place] recipients, pregnant women, or patients whose health plans or employers are members of the [address]    Online behavior modification   http://[place]. Quitlogix. org   Online support program to help patients through each step of the quitting process. Available 24 hours a day 7 days a week. Provides up to date researched based tool, step-by-step guides, and motivational messages. Online behavior modification   www.lungusa.org/stop-smoking/how-to-quit   HelpLine: [address] (474-1988)   Email questions to:  Kayla@[employer]. org    Website offers resources to help tobacco users figure out their reasons for quitting and then take the big step of quitting for good. Hypnosis   Location: [address] [facility], [employer] [address], [place]   Contact: Wiliam Caldwell, PhD at [phone]   Hypnosis for tobacco cessation   Cost $225 for the initial session and $175 for each session afterwards. Most patients require 6-8 sessions. There is the option to submit through the patients insurance. Hypnosis and behavior modification   Location: [place] [address],  [address] [employer] [address], [place]   Contact: Sharyle Slate, PhD at [phone]  [facility] and hypnosis for nicotine addition   Cost: For uninsured patients:  Please call above phone number  Cost for insured patients depends on patients insurance plan. Behavior modification   Location: [facility], [address] [place] [address] [address] [employer] [address], [place]   Contact: Norma Moran include four one-on-one appointments between the patient and a respiratory therapist.  The four appointments span over three weeks. The respiratory therapist schedules one of the appointments to occur 48 hours after the patients quit date.  Cost $100 total for the four sessions. Tobacco cessation products are not included in the cost and are not provided by [facility].       Buy gums over the counter.

## 2022-01-03 NOTE — PROGRESS NOTES
S: 39 y.o. female with   Chief Complaint   Patient presents with   [facility] ED Follow-up     Establish PCP and [facility] 12/20/2021 Follow-up       HPI: please see resident note for HPI and ROS. Here to establish  Wants to quit smoking   Started at 11yo  Previously quit with chantix, some GI upset but ok with doing it again    SOB worsening over past year  Worse with stairs, chest tightness    Hx of childhood asthma  Uses albuterol 2-3 times a day  Night symptoms as well   No other meds      BP Readings from Last 3 Encounters:   01/03/22 134/82   12/20/21 105/61   11/24/21 102/60     Wt Readings from Last 3 Encounters:   01/03/22 140 lb 12.8 oz (63.9 kg)   11/24/21 142 lb (64.4 kg)   07/31/21 138 lb (62.6 kg)       O: VS:  height is 5' (1.524 m) and weight is 140 lb 12.8 oz (63.9 kg). Her temporal temperature is 96.8 °F (36 °C). Her blood pressure is 134/82 and her pulse is 97. Her respiration is 16 and oxygen saturation is 98%. AAO/NAD, appropriate affect for mood       Diagnosis Orders   1. Establishing care with new doctor, encounter for     2. Currently attempting to quit smoking     3. Intermittent asthma without complication, unspecified asthma severity         Plan:  Chantix and nicotine replacement for smoking  Consider smoking cessation clinic  ICS for asthma   Consider PFTs in the future     Health Maintenance Due   Topic Date Due    Varicella vaccine (1 of 2 - 2-dose childhood series) Never done    COVID-19 Vaccine (1) Never done    Pneumococcal 0-64 years Vaccine (1 of 2 - PPSV23) Never done    Depression Screen  Never done    DTaP/Tdap/Td vaccine (1 - Tdap) Never done    Cervical cancer screen  Never done    Flu vaccine (1) Never done         I have discussed the case, including pertinent history and exam findings with the resident and attending physician. I agree with the documented assessment and plan as documented by the resident.         Malena Jeff MD 1/3/2022 2:31 PM

## 2022-01-03 NOTE — PROGRESS NOTES
Health Maintenance Due   Topic Date Due    Varicella vaccine (1 of 2 - 2-dose childhood series) Never done    COVID-19 Vaccine (1) Never done Declined    Pneumococcal 0-64 years Vaccine (1 of 2 - PPSV23) Never done    Depression Screen  Never done    DTaP/Tdap/Td vaccine (1 - Tdap) Never done    Cervical cancer screen  Never done    Flu vaccine (1) Never done Declined

## 2022-01-05 ENCOUNTER — TELEPHONE (OUTPATIENT)
Dept: PHARMACY | Age: 37
End: 2022-01-05

## 2022-01-05 NOTE — TELEPHONE ENCOUNTER
Referral to [facility]. [facility] Medication Management Smoking Cessation Clinic received from Dr. Nishant Nolen for Smoking Cessation Patient Education/Counseling. I spoke with pt and explained our 12 week smoking cessation program and pt is interested in starting the program.  We set up patients first appointment for 1/19/22 at 1pm to be done in the office. Pt notified to come to the Medication Management Department located in [facility] on the first floor of [facility]. Pt had no questions at this time.

## 2022-01-13 ENCOUNTER — HOSPITAL ENCOUNTER (EMERGENCY)
Age: 37
Discharge: HOME | End: 2022-01-13
Payer: COMMERCIAL

## 2022-01-13 VITALS
OXYGEN SATURATION: 96 % | WEIGHT: 140 LBS | SYSTOLIC BLOOD PRESSURE: 110 MMHG | RESPIRATION RATE: 18 BRPM | TEMPERATURE: 97 F | DIASTOLIC BLOOD PRESSURE: 64 MMHG | HEIGHT: 60 IN | HEART RATE: 69 BPM | BODY MASS INDEX: 27.48 KG/M2

## 2022-01-13 DIAGNOSIS — H10.022 PINK EYE, LEFT: Primary | ICD-10-CM

## 2022-01-13 PROCEDURE — 99213 OFFICE O/P EST LOW 20 MIN: CPT | Performed by: NURSE PRACTITIONER

## 2022-01-13 PROCEDURE — 99213 OFFICE O/P EST LOW 20 MIN: CPT

## 2022-01-13 RX ORDER — POLYMYXIN B SULFATE AND TRIMETHOPRIM 1; 10000 MG/ML; [USP'U]/ML
1 SOLUTION OPHTHALMIC EVERY 4 HOURS
Qty: 1 EACH | Refills: 0 | Status: SHIPPED | OUTPATIENT
Start: 2022-01-13 | End: 2022-01-20

## 2022-01-13 ASSESSMENT — PAIN DESCRIPTION - FREQUENCY: FREQUENCY: CONTINUOUS

## 2022-01-13 ASSESSMENT — PAIN DESCRIPTION - PAIN TYPE: TYPE: ACUTE PAIN

## 2022-01-13 ASSESSMENT — ENCOUNTER SYMPTOMS
EYE REDNESS: 1
EYE ITCHING: 1
EYE DISCHARGE: 1

## 2022-01-13 ASSESSMENT — PAIN DESCRIPTION - ONSET: ONSET: ON-GOING

## 2022-01-13 ASSESSMENT — VISUAL ACUITY
OS: 20/20
OU: 20/25
OD: 20/25

## 2022-01-13 ASSESSMENT — PAIN DESCRIPTION - PROGRESSION: CLINICAL_PROGRESSION: NOT CHANGED

## 2022-01-13 ASSESSMENT — PAIN DESCRIPTION - DESCRIPTORS: DESCRIPTORS: ACHING

## 2022-01-13 ASSESSMENT — PAIN SCALES - GENERAL: PAINLEVEL_OUTOF10: 3

## 2022-01-13 ASSESSMENT — PAIN DESCRIPTION - LOCATION: LOCATION: EYE

## 2022-01-13 ASSESSMENT — PAIN DESCRIPTION - ORIENTATION: ORIENTATION: LEFT

## 2022-01-13 NOTE — Clinical Note
Rahel Beckett was seen and treated in our emergency department on 1/13/2022. She may return to work on 01/14/2022. If you have any questions or concerns, please don't hesitate to call.       Trinidad Stevenson, YVETTE - CNP

## 2022-01-14 NOTE — ED PROVIDER NOTES
[address]       Chief Complaint   Patient presents with    Eye Pain       Nurses Notes reviewed and I agree except as noted in the HPI. HISTORY OF PRESENT ILLNESS   Jeni Milligan is a 39 y.o. female who presents for evaluation left eye irritation that started yesterday. No injury or trauma. No foreign body sensation. The patient/patient representative has no other acute complaints at this time. REVIEW OF SYSTEMS     Review of Systems   Eyes: Positive for discharge, redness and itching. Negative for visual disturbance. Left eye   All other systems reviewed and are negative. PAST MEDICAL HISTORY         Diagnosis Date    Asthma     AS A CHILD    Bronchitis     Endometriosis     PID (acute pelvic inflammatory disease) 2003    Placenta previa 2007       SURGICAL HISTORY     Patient  has a past surgical history that includes Dilation and curettage of uterus (2001); laparoscopy (N/A, 10/10/2017); and Tubal ligation (2012).     CURRENT MEDICATIONS       Discharge Medication List as of 1/13/2022  7:20 PM      CONTINUE these medications which have NOT CHANGED    Details   varenicline (CHANTIX STARTING MONTH PAK) 0.5 MG X 11 & 1 MG X 42 tablet Take by mouth., Disp-1 box, R-0Normal      fluticasone (FLOVENT HFA) 110 MCG/ACT inhaler Inhale 2 puffs into the lungs 2 times daily, Disp-1 each, R-1Normal      albuterol (PROVENTIL) 2.5 MG/0.5ML NEBU nebulizer solution Take 0.5 mLs by nebulization every 6 hours as needed for Wheezing, Disp-60 each, R-1Normal      albuterol sulfate  (90 Base) MCG/ACT inhaler Inhale 2 puffs into the lungs every 6 hours as needed for Wheezing or Shortness of Breath, Disp-18 g, R-0Normal      Spacer/Aero-Holding Chambers BETTY DAILY PRN Starting Mon 1/3/2022, Disp-1 each, R-0, Normal      acetaminophen (TYLENOL) 500 MG tablet Take 1,000 mg by mouth every 6 hours as needed for PainHistorical Med DISPOSITION/PLAN   DISPOSITION Decision To Discharge 01/13/2022 07:19:35 PM       If COVID-19 positive or COVID-19 by PCR is pending at time of discharge patient is to quarantine/isolate according to [facility] guidelines. Physical assessment findings, diagnostic testing(s) if applicable, and vital signs reviewed with patient/patient representative. Differential diagnosis(s) discussed with patient/patient representative. Prescription medications and/or over-the-counter medications for symptom management discussed. Patient is to follow-up with family care provider in 2-3 days if no improvement. If symptoms should worsen or change, go to the ED. Patient/patient representative is aware of care plan, questions answered, verbalizes understanding and is in agreement. Printed instructions attached to after visit summary. Problem List Items Addressed This Visit     None      Visit Diagnoses     Pink eye, left    -  Primary    Relevant Medications    trimethoprim-polymyxin b (POLYTRIM) 18844-4.1 UNIT/ML-% ophthalmic solution            PATIENT REFERRED TO:  [address] [place] [address],[address] [address] [facility] [address] [facility]  Schedule an appointment as soon as possible for a visit in 3 days  if you do not have a family provider, If symptoms change/worsen, go to the [address] [facility] - [facility]    Please note that some or all of this chart was generated using [employer] voice recognition software. Although every effort was made to ensure the accuracy of this automated transcription, some errors in transcription may have occurred.         YVETTE Carvalho CNP  01/13/22 2072

## 2022-01-17 ENCOUNTER — HOSPITAL ENCOUNTER (EMERGENCY)
Age: 37
Discharge: HOME | End: 2022-01-17
Payer: COMMERCIAL

## 2022-01-17 VITALS
WEIGHT: 140 LBS | HEIGHT: 60 IN | RESPIRATION RATE: 16 BRPM | TEMPERATURE: 97.2 F | OXYGEN SATURATION: 97 % | SYSTOLIC BLOOD PRESSURE: 99 MMHG | DIASTOLIC BLOOD PRESSURE: 65 MMHG | HEART RATE: 116 BPM | BODY MASS INDEX: 27.48 KG/M2

## 2022-01-17 DIAGNOSIS — Z20.822 ENCOUNTER FOR LABORATORY TESTING FOR COVID-19 VIRUS: Primary | ICD-10-CM

## 2022-01-17 PROCEDURE — 99213 OFFICE O/P EST LOW 20 MIN: CPT

## 2022-01-17 PROCEDURE — 87636 SARSCOV2 & INF A&B AMP PRB: CPT

## 2022-01-17 PROCEDURE — 99213 OFFICE O/P EST LOW 20 MIN: CPT | Performed by: NURSE PRACTITIONER

## 2022-01-17 ASSESSMENT — ENCOUNTER SYMPTOMS
COUGH: 0
SORE THROAT: 0
NAUSEA: 0
EYE ITCHING: 0
VOMITING: 0
EYE REDNESS: 0
SINUS PRESSURE: 0
ABDOMINAL PAIN: 0
CHEST TIGHTNESS: 0
SHORTNESS OF BREATH: 0
DIARRHEA: 0

## 2022-01-17 NOTE — ED NOTES
Pt verbalized discharge instructions. Pt informed to go to ER if develop chest pain, shortness of breath or abdominal pain. Pt ambulatory out in stable condition. Assessment unchanged.        Dorothy Gutierrez RN  01/17/22 7582

## 2022-01-17 NOTE — ED PROVIDER NOTES
[address] Encounter      CHIEFCOMPLAINT       Chief Complaint   Patient presents with    Covid Testing     exposed by boyfriend       Nurses Notes reviewed and I agree except as noted in the HPI. HISTORY OF PRESENT ILLNESS   Cora Crenshaw is a 39 y.o. female who presents for evaluation and COVID testing. At the present time of evaluation patient denies signs or symptoms off illness. The patient/patient representative has no other acute complaints at this time. REVIEW OF SYSTEMS     Review of Systems   Constitutional: Negative for chills, fatigue and fever. HENT: Negative for congestion, ear pain, sinus pressure and sore throat. Eyes: Negative for redness and itching. Respiratory: Negative for cough, chest tightness and shortness of breath. Cardiovascular: Negative for chest pain. Gastrointestinal: Negative for abdominal pain, diarrhea, nausea and vomiting. Skin: Negative for rash. Allergic/Immunologic: Negative for environmental allergies and food allergies. Neurological: Negative for headaches. Hematological: Negative for adenopathy. PAST MEDICAL HISTORY         Diagnosis Date    Asthma     AS A CHILD    Bronchitis     Endometriosis     PID (acute pelvic inflammatory disease) 2003    Placenta previa 2007       SURGICAL HISTORY     Patient  has a past surgical history that includes Dilation and curettage of uterus (2001); laparoscopy (N/A, 10/10/2017); and Tubal ligation (2012).     CURRENT MEDICATIONS       Previous Medications    ACETAMINOPHEN (TYLENOL) 500 MG TABLET    Take 1,000 mg by mouth every 6 hours as needed for Pain    ALBUTEROL (PROVENTIL) 2.5 MG/0.5ML NEBU NEBULIZER SOLUTION    Take 0.5 mLs by nebulization every 6 hours as needed for Wheezing    ALBUTEROL SULFATE  (90 BASE) MCG/ACT INHALER    Inhale 2 puffs into the lungs every 6 hours as needed for Wheezing or Shortness of Breath    FLUTICASONE (FLOVENT HFA) 110 MCG/ACT INHALER    Inhale 2 puffs into the lungs 2 times daily    SPACER/AERO-HOLDING CHAMBERS BETTY    1 Device by Does not apply route daily as needed (inhaled corticosteroid usage)    TRIMETHOPRIM-POLYMYXIN B (POLYTRIM) 93047-2.1 UNIT/ML-% OPHTHALMIC SOLUTION    Place 1 drop into the left eye every 4 hours for 7 days    VARENICLINE (CHANTIX STARTING MONTH PAK) 0.5 MG X 11 & 1 MG X 42 TABLET    Take by mouth. ALLERGIES     Patient is is allergic to pcn [penicillins] and penicillins. FAMILY HISTORY     Patient'sfamily history includes Cancer in her maternal grandfather and maternal grandmother; No Known Problems in her father and mother. SOCIAL HISTORY     Patient  reports that she has quit smoking. She smoked 0.00 packs per day for 18.00 years. She has never used smokeless tobacco. She reports current alcohol use. She reports that she does not use drugs. PHYSICAL EXAM     ED TRIAGE VITALS  BP: 99/65, Temp: 97.2 °F (36.2 °C), Pulse: 116, Resp: 16, SpO2: 97 %  Physical Exam  Vitals and nursing note reviewed. Constitutional:       General: She is not in acute distress. Appearance: Normal appearance. She is well-developed and well-groomed. HENT:      Head: Normocephalic and atraumatic. Right Ear: External ear normal.      Left Ear: External ear normal.      Nose: Nose normal.      Mouth/Throat:      Lips: Pink. Mouth: Mucous membranes are moist.   Eyes:      Conjunctiva/sclera: Conjunctivae normal.      Right eye: Right conjunctiva is not injected. Left eye: Left conjunctiva is not injected. Pupils: Pupils are equal.   Cardiovascular:      Rate and Rhythm: Normal rate. Heart sounds: Normal heart sounds. Pulmonary:      Effort: Pulmonary effort is normal. No respiratory distress. Breath sounds: Normal breath sounds. Musculoskeletal:      Cervical back: Normal range of motion. Skin:     General: Skin is warm and dry. Findings: No rash (on exposed surfaces). Neurological:      Mental Status: She is alert and oriented to person, place, and time. Psychiatric:         Mood and Affect: Mood normal.         Speech: Speech normal.         Behavior: Behavior is cooperative. DIAGNOSTIC RESULTS   Labs:  Abnormal Labs Reviewed - No abnormal labs to display     IMAGING:  No orders to display     URGENT CARE COURSE:     Vitals:    01/17/22 1448   BP: 99/65   Pulse: 116   Resp: 16   Temp: 97.2 °F (36.2 °C)   TempSrc: Temporal   SpO2: 97%   Weight: 140 lb (63.5 kg)   Height: 5' (1.524 m)       Medications - No data to display  PROCEDURES:  FINALIMPRESSION      1. Encounter for laboratory testing for COVID-19 virus        DISPOSITION/PLAN   DISPOSITION    Discharge     ED Course as of 01/17/22 1452   Mon Jan 17, 2022   [address] [place] [address]      ED Course User Index  [facility], [facility]       If COVID-19 positive or COVID-19 by PCR is pending at time of discharge patient is to quarantine/isolate according to [employer] guidelines. Physical assessment findings, diagnostic testing(s) if applicable, and vital signs reviewed with patient/patient representative. Differential diagnosis(s) discussed with patient/patient representative. Prescription medications and/or over-the-counter medications for symptom management discussed. Patient is to follow-up with family care provider in 2-3 days if no improvement. If symptoms should worsen or change, go to the ED. Patient/patient representative is aware of care plan, questions answered, verbalizes understanding and is in agreement. Printed instructions attached to after visit summary. Problem List Items Addressed This Visit     None      Visit Diagnoses     Encounter for laboratory testing for COVID-19 virus    -  Primary            PATIENT REFERRED TO:  [address] [place] [address],[address]  [address] [address] [facility] [address] [facility]  Schedule an appointment as soon as possible for a visit in 3 days  if you do not have a family provider, If symptoms change/worsen, go to the [id number] [facility], YVETTE - CNP    Please note that some or all of this chart was generated using [employer] voice recognition software. Although every effort was made to ensure the accuracy of this automated transcription, some errors in transcription may have occurred.         YVETTE Ochoa CNP  01/17/22 0595

## 2022-01-17 NOTE — ED TRIAGE NOTES
Exposed to [place]. Needs to be tested prior to returning to work per pt statement. Pt is asymptomatic currently.

## 2022-01-18 LAB
INFLUENZA A: NOT DETECTED
INFLUENZA B: NOT DETECTED
SARS-COV-2 RNA, RT PCR: DETECTED

## 2022-01-19 ENCOUNTER — TELEPHONE (OUTPATIENT)
Dept: FAMILY MEDICINE CLINIC | Age: 37
End: 2022-01-19

## 2022-01-19 NOTE — TELEPHONE ENCOUNTER
I called pt at this time regarding our appt today for smoking cessation due to I saw that pt just had a COVID positive test on 1/17. I spoke with pt and she states she was going to cancel appt today. I asked pt if she wanted to reschedule for a couple weeks and pt states \"I will just call you when I am ready to reschedule\".

## 2022-01-19 NOTE — TELEPHONE ENCOUNTER
Pt called states that she tested positive for COVID on 1/17/2022. Pt employer is requesting a COVID repeat test in order to  Return to work. Pt is also needing a letter to return to work 1/26/2022.

## 2022-01-20 NOTE — TELEPHONE ENCOUNTER
Patient to come by Friday 1/21 to  work letter. Please call Ms. Andie Weems today to let her know of office hours for Friday.

## 2022-02-14 PROBLEM — J45.20 INTERMITTENT ASTHMA WITHOUT COMPLICATION: Status: ACTIVE | Noted: 2022-02-14

## 2022-09-13 ENCOUNTER — HOSPITAL ENCOUNTER (EMERGENCY)
Age: 37
Discharge: HOME | End: 2022-09-13
Attending: STUDENT IN AN ORGANIZED HEALTH CARE EDUCATION/TRAINING PROGRAM
Payer: COMMERCIAL

## 2022-09-13 ENCOUNTER — APPOINTMENT (OUTPATIENT)
Dept: GENERAL RADIOLOGY | Age: 37
End: 2022-09-13
Payer: COMMERCIAL

## 2022-09-13 VITALS
TEMPERATURE: 98.2 F | OXYGEN SATURATION: 99 % | RESPIRATION RATE: 16 BRPM | SYSTOLIC BLOOD PRESSURE: 108 MMHG | HEART RATE: 89 BPM | DIASTOLIC BLOOD PRESSURE: 62 MMHG

## 2022-09-13 DIAGNOSIS — M46.1 SACROILIITIS (HCC): Primary | ICD-10-CM

## 2022-09-13 PROCEDURE — 6370000000 HC RX 637 (ALT 250 FOR IP): Performed by: STUDENT IN AN ORGANIZED HEALTH CARE EDUCATION/TRAINING PROGRAM

## 2022-09-13 PROCEDURE — 96372 THER/PROPH/DIAG INJ SC/IM: CPT

## 2022-09-13 PROCEDURE — 99284 EMERGENCY DEPT VISIT MOD MDM: CPT

## 2022-09-13 PROCEDURE — 6360000002 HC RX W HCPCS: Performed by: STUDENT IN AN ORGANIZED HEALTH CARE EDUCATION/TRAINING PROGRAM

## 2022-09-13 RX ORDER — LIDOCAINE 50 MG/G
1 PATCH TOPICAL DAILY
Qty: 10 PATCH | Refills: 0 | Status: SHIPPED | OUTPATIENT
Start: 2022-09-13 | End: 2022-09-23

## 2022-09-13 RX ORDER — METHYLPREDNISOLONE 4 MG/1
TABLET ORAL
Qty: 1 KIT | Refills: 0 | Status: SHIPPED | OUTPATIENT
Start: 2022-09-13 | End: 2022-09-19

## 2022-09-13 RX ORDER — ACETAMINOPHEN 325 MG/1
650 TABLET ORAL ONCE
Status: COMPLETED | OUTPATIENT
Start: 2022-09-13 | End: 2022-09-13

## 2022-09-13 RX ORDER — HYDROCODONE BITARTRATE AND ACETAMINOPHEN 5; 325 MG/1; MG/1
1 TABLET ORAL ONCE
Status: COMPLETED | OUTPATIENT
Start: 2022-09-13 | End: 2022-09-13

## 2022-09-13 RX ORDER — DEXAMETHASONE 4 MG/1
8 TABLET ORAL ONCE
Status: COMPLETED | OUTPATIENT
Start: 2022-09-13 | End: 2022-09-13

## 2022-09-13 RX ORDER — LIDOCAINE 4 G/G
1 PATCH TOPICAL DAILY
Status: DISCONTINUED | OUTPATIENT
Start: 2022-09-13 | End: 2022-09-13 | Stop reason: HOSPADM

## 2022-09-13 RX ORDER — KETOROLAC TROMETHAMINE 30 MG/ML
15 INJECTION, SOLUTION INTRAMUSCULAR; INTRAVENOUS ONCE
Status: COMPLETED | OUTPATIENT
Start: 2022-09-13 | End: 2022-09-13

## 2022-09-13 RX ORDER — HYDROCODONE BITARTRATE AND ACETAMINOPHEN 5; 325 MG/1; MG/1
1 TABLET ORAL EVERY 6 HOURS PRN
Qty: 8 TABLET | Refills: 0 | Status: SHIPPED | OUTPATIENT
Start: 2022-09-13 | End: 2022-09-15

## 2022-09-13 RX ADMIN — ACETAMINOPHEN 650 MG: 325 TABLET ORAL at 18:08

## 2022-09-13 RX ADMIN — HYDROCODONE BITARTRATE AND ACETAMINOPHEN 1 TABLET: 5; 325 TABLET ORAL at 18:08

## 2022-09-13 RX ADMIN — KETOROLAC TROMETHAMINE 15 MG: 30 INJECTION, SOLUTION INTRAMUSCULAR; INTRAVENOUS at 18:10

## 2022-09-13 RX ADMIN — DEXAMETHASONE 8 MG: 4 TABLET ORAL at 18:09

## 2022-09-13 NOTE — ED TRIAGE NOTES
Pt to er. Pt c/o lower rt side back pain. Denies any injury. States has been hurting since sat.  Pt ambulatry in to ER>

## 2022-09-14 NOTE — ED PROVIDER NOTES
[address] [facility] [address] [address] EMERGENCY DEPT  EMERGENCY DEPARTMENT     Pt Name: Enrique Mauro  MRN: [id number]  [place] 1985  Date of evaluation: 9/13/2022  Provider: Jennifer Corbin MD,    [address] [place] [address]       Chief Complaint   Patient presents with    Back Pain       HISTORY OF PRESENT ILLNESS    Enrique Mauro is a 39 y.o. female who presents to the emergency department from home with chief complaint of right lower back pain. Patient reports that she went to a wedding and wore heels. At the end of the night she began to experience the right lower back pain. The right lower back pain is localized to the sacroiliac region, is throbbing in nature, and is moderate to severe in intensity. She denies focal neurological deficit or urinary/bowel incontinence or retention. She denies saddle anesthesia. She denies trauma to the back. She denies a history of IV drug use or immune O suppression. She denies back injections. Triage notes and Nursing notes were reviewed by myself. Any discrepancies are addressed above.     PAST MEDICAL HISTORY     Past Medical History:   Diagnosis Date    Asthma     AS A CHILD    Bronchitis     Endometriosis     Intermittent asthma without complication 3/39/6425    PID (acute pelvic inflammatory disease) 2003    Placenta previa 2007       SURGICAL HISTORY       Past Surgical History:   Procedure Laterality Date    DILATION AND CURETTAGE OF UTERUS  2001    LAPAROSCOPY N/A 10/10/2017    DIAGNOSTIC LAPAROSCOPY WITH CAUTERY OF ENDOMETRIOSIS AND LYSIS OF ADHESIONS performed by Cathy Shepherd MD at [facility]  2012       CURRENT MEDICATIONS       Discharge Medication List as of 9/13/2022  7:14 PM        CONTINUE these medications which have NOT CHANGED    Details   varenicline (CHANTIX STARTING MONTH PAK) 0.5 MG X 11 & 1 MG X 42 tablet Take by mouth., Disp-1 box, R-0Normal      fluticasone (FLOVENT HFA) 110 MCG/ACT inhaler Inhale 2 puffs into the lungs 2 times daily, Disp-1 each, R-1Normal      Spacer/Aero-Holding Chambers BETTY DAILY PRN Starting Mon 1/3/2022, Disp-1 each, R-0, Normal      albuterol (PROVENTIL) 2.5 MG/0.5ML NEBU nebulizer solution Take 0.5 mLs by nebulization every 6 hours as needed for Wheezing, Disp-60 each, R-1Normal      albuterol sulfate  (90 Base) MCG/ACT inhaler Inhale 2 puffs into the lungs every 6 hours as needed for Wheezing or Shortness of Breath, Disp-18 g, R-0Normal      acetaminophen (TYLENOL) 500 MG tablet Take 1,000 mg by mouth every 6 hours as needed for PainHistorical Med             ALLERGIES     Pcn [penicillins] and Penicillins    FAMILY HISTORY       Family History   Problem Relation Age of Onset    No Known Problems Mother     No Known Problems Father     Cancer Maternal Grandmother     Cancer Maternal Grandfather         SOCIAL HISTORY       Social History     Socioeconomic History    Marital status:      Spouse name: None    Number of children: None    Years of education: None    Highest education level: None   Tobacco Use    Smoking status: Former     Packs/day: 0.00     Years: 18.00     Pack years: 0.00     Types: Cigarettes    Smokeless tobacco: Never   Vaping Use    Vaping Use: Never used   Substance and Sexual Activity    Alcohol use: Yes     Comment: rarely    Drug use: No    Sexual activity: Yes   Social History Narrative    ** Merged History Encounter **          Social Determinants of Health     Financial Resource Strain: Medium Risk    Difficulty of Paying Living Expenses: Somewhat hard   Food Insecurity: No Food Insecurity    Worried About Running Out of Food in the Last Year: Never true    Ran Out of Food in the Last Year: Never true       REVIEW OF SYSTEMS     Review of Systems  - CONSTITUTIONAL: Denies weight loss, fever and chills. - HEENT: Denies changes in vision and hearing.    -   RESPIRATORY: Denies SOB and cough.       - CV: Denies palpitations and CP.       - GI: Denies abdominal pain, nausea, vomiting and diarrhea.      - : Denies dysuria and urinary frequency. - MSK: As per HPI      - SKIN: Denies rash and pruritus.    -   NEUROLOGICAL: Denies headache and syncope. - PSYCHIATRIC: Denies recent changes in mood. Denies anxiety and depression. PHYSICAL EXAM    (up to 7 for level 4, 8 or more for level 5)     ED Triage Vitals [09/13/22 1723]   BP Temp Temp Source Heart Rate Resp SpO2 Height Weight   108/62 98.2 °F (36.8 °C) Oral 89 16 99 % -- --       Physical Exam  Musculoskeletal:         General: No swelling, deformity or signs of injury. Normal range of motion. Cervical back: Normal range of motion and neck supple. No rigidity or tenderness. Neurological:      General: No focal deficit present. Mental Status: She is oriented to person, place, and time. Sensory: No sensory deficit. Motor: No weakness. Gait: Gait normal.      Deep Tendon Reflexes: Reflexes normal.   Back: No midline tenderness or step-offs. No muscle spasms. Significant point tenderness this to the right SI joint. Straight leg test negative. DIAGNOSTIC RESULTS     EKG:(none if blank)  All E[facility]'s are [facility] who either signs or Co-signs this chart in the absence of a cardiologist.        RADIOLOGY: (none if blank)   Interpretation per the Radiologistbelow, if available at the time of this note:    No orders to display       LABS:  Labs Reviewed - No data to display    All other labs were within normal range or not returned as of this dictation. Please note, any cultures that may have been sent were not resulted at the time of this patient visit. EMERGENCY DEPARTMENT COURSE andMedical Decision Making:     MDM  /  ED Course as of 09/14/22 0057   Tue Sep 13, 2022   1714 Patient evaluated. She has point tenderness to the right sacroiliac joint region.   There is no tenderness down the C-spine or concern for epidural abscess/epidural hematoma/cauda equina/conus medullaris/other cord compression pathology. Pain control and reevaluate [AM]   1906 Patient reevaluated. She feels significantly better. Her pain is down to 2 out of 10 in severity. She has no focal neurological deficits of her lower extremities. She is able to bear weight and ambulate without difficulty. Patient will be given a prescription for Medrol Dosepak as well as 1 day of Norco.  Recommend primary care follow-up for further work-up and evaluation. Strict return precautions discussed. [AM]      ED Course User Index  [AM] Carlos Huynh MD       Strict returnprecautions and follow up instructions were discussed with the patient with which the patient agrees    ED Medications administered this visit:    Medications   ketorolac (TORADOL) injection 15 mg (15 mg IntraMUSCular Given 9/13/22 1810)   dexamethasone (DECADRON) tablet 8 mg (8 mg Oral Given 9/13/22 1809)   HYDROcodone-acetaminophen (NORCO) 5-325 MG per tablet 1 tablet (1 tablet Oral Given 9/13/22 1808)   acetaminophen (TYLENOL) tablet 650 mg (650 mg Oral Given 9/13/22 1808)         Procedures: (None if blank)       CLINICAL       1. Sacroiliitis [facility])          DISPOSITION/PLAN   DISPOSITION Decision To Discharge 09/13/2022 07:14:12 PM      PATIENT REFERRED TO:  [address] [place] [id number]  Call in 3 days        DISCHARGE MEDICATIONS:  Discharge Medication List as of 9/13/2022  7:14 PM        START taking these medications    Details   methylPREDNISolone (MEDROL, ITZEL,) 4 MG tablet By mouth., Disp-1 kit, R-0Normal      HYDROcodone-acetaminophen (NORCO) 5-325 MG per tablet Take 1 tablet by mouth every 6 hours as needed for Pain for up to 2 days. Intended supply: 3 days.  Take lowest dose possible to manage pain, Disp-8 tablet, R-0Normal      lidocaine (LIDODERM) 5 % Place 1 patch onto the skin daily for 10 days 12 hours on, 12 hours off., Disp-10 patch, R-0Normal (Please note that portions of this note were completed with a voice recognition program.  Efforts were made to edit the dictations but occasionallywords are mis-transcribed. )      Hui Braun MD,(electronically signed)  Attending Physician, Emergency Department         Hui Braun MD  09/14/22 3684

## 2022-12-14 ENCOUNTER — HOSPITAL ENCOUNTER (EMERGENCY)
Age: 37
Discharge: HOME | End: 2022-12-14
Payer: COMMERCIAL

## 2022-12-14 VITALS
HEART RATE: 89 BPM | HEIGHT: 60 IN | DIASTOLIC BLOOD PRESSURE: 61 MMHG | TEMPERATURE: 98 F | SYSTOLIC BLOOD PRESSURE: 102 MMHG | OXYGEN SATURATION: 98 % | RESPIRATION RATE: 16 BRPM | WEIGHT: 130 LBS | BODY MASS INDEX: 25.52 KG/M2

## 2022-12-14 DIAGNOSIS — M79.601 RIGHT ARM PAIN: Primary | ICD-10-CM

## 2022-12-14 DIAGNOSIS — J06.9 VIRAL UPPER RESPIRATORY TRACT INFECTION: ICD-10-CM

## 2022-12-14 PROCEDURE — 99213 OFFICE O/P EST LOW 20 MIN: CPT | Performed by: NURSE PRACTITIONER

## 2022-12-14 PROCEDURE — 99213 OFFICE O/P EST LOW 20 MIN: CPT

## 2022-12-14 RX ORDER — IBUPROFEN 600 MG/1
600 TABLET ORAL 4 TIMES DAILY PRN
Qty: 120 TABLET | Refills: 0 | Status: SHIPPED | OUTPATIENT
Start: 2022-12-14

## 2022-12-14 ASSESSMENT — ENCOUNTER SYMPTOMS
DIARRHEA: 0
COUGH: 1
EYE PAIN: 0
NAUSEA: 0
WHEEZING: 0
BLOOD IN STOOL: 0
RHINORRHEA: 0
ABDOMINAL PAIN: 0
CONSTIPATION: 0
SORE THROAT: 1
SHORTNESS OF BREATH: 0
SINUS PAIN: 0
VOMITING: 0
SINUS PRESSURE: 0

## 2022-12-14 ASSESSMENT — PAIN - FUNCTIONAL ASSESSMENT: PAIN_FUNCTIONAL_ASSESSMENT: 0-10

## 2022-12-14 ASSESSMENT — PAIN SCALES - GENERAL: PAINLEVEL_OUTOF10: 2

## 2022-12-14 ASSESSMENT — PAIN DESCRIPTION - ONSET: ONSET: ON-GOING

## 2022-12-14 ASSESSMENT — PAIN DESCRIPTION - FREQUENCY: FREQUENCY: INTERMITTENT

## 2022-12-14 NOTE — Clinical Note
Eliza Richards was seen and treated in our emergency department on 12/14/2022. She may return to work on 12/16/2022. If you have any questions or concerns, please don't hesitate to call.       Oneal Sellers, APRN - CNP

## 2022-12-14 NOTE — ED PROVIDER NOTES
[address]       Chief Complaint   Patient presents with    Cough     Sore throat        Nurses Notes reviewed and I agree except as noted in the HPI. HISTORY OF PRESENT ILLNESS   Simona Brizuela is a 40 y.o. female who presents to urgent care with complaint of cough, congestion and sore throat that started on 12/12/2022. Patient denies taking any medications for her symptoms. She also complains of some right arm pain that started with the elbow and is now moving to her forearm towards her wrist and up in her upper arm to her shoulder. 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 She states that she has been doing this job for about 3 months. She states that she has had the pain for a while and it just keeps getting worse. Patient denies any injury to the area. Patient denies other symptoms including chest pain, shortness of breath, nausea, vomiting, diarrhea or fever. REVIEW OF SYSTEMS     Review of Systems   Constitutional:  Positive for fatigue. Negative for appetite change, chills, fever and unexpected weight change. HENT:  Positive for congestion and sore throat. Negative for ear pain, rhinorrhea, sinus pressure, sinus pain and sneezing. Eyes:  Negative for pain and visual disturbance. Respiratory:  Positive for cough. Negative for shortness of breath and wheezing. Cardiovascular:  Negative for chest pain, palpitations and leg swelling. Gastrointestinal:  Negative for abdominal pain, blood in stool, constipation, diarrhea, nausea and vomiting. Genitourinary:  Negative for dysuria, frequency and hematuria. Musculoskeletal:  Positive for arthralgias (RIGHT ELBOW PAIN). Negative for joint swelling and neck stiffness. Skin:  Negative for rash. Neurological:  Negative for dizziness, syncope, weakness, light-headedness and headaches. Hematological:  Does not bruise/bleed easily.      PAST MEDICAL HISTORY         Diagnosis Date    Asthma     AS A CHILD    Bronchitis     Endometriosis     Intermittent asthma without complication 7/31/1770    PID (acute pelvic inflammatory disease) 2003    Placenta previa 2007       SURGICAL HISTORY     Patient  has a past surgical history that includes Dilation and curettage of uterus (2001); laparoscopy (N/A, 10/10/2017); and Tubal ligation (2012). CURRENT MEDICATIONS       Previous Medications    ACETAMINOPHEN (TYLENOL) 500 MG TABLET    Take 1,000 mg by mouth every 6 hours as needed for Pain    ALBUTEROL (PROVENTIL) 2.5 MG/0.5ML NEBU NEBULIZER SOLUTION    Take 0.5 mLs by nebulization every 6 hours as needed for Wheezing    ALBUTEROL SULFATE  (90 BASE) MCG/ACT INHALER    Inhale 2 puffs into the lungs every 6 hours as needed for Wheezing or Shortness of Breath    FLUTICASONE (FLOVENT HFA) 110 MCG/ACT INHALER    Inhale 2 puffs into the lungs 2 times daily    SPACER/AERO-HOLDING CHAMBERS BETTY    1 Device by Does not apply route daily as needed (inhaled corticosteroid usage)    VARENICLINE (CHANTIX STARTING MONTH PAK) 0.5 MG X 11 & 1 MG X 42 TABLET    Take by mouth. ALLERGIES     Patient is is allergic to pcn [penicillins] and penicillins. FAMILY HISTORY     Patient'sfamily history includes Cancer in her maternal grandfather and maternal grandmother; No Known Problems in her father and mother. SOCIAL HISTORY     Patient  reports that she has quit smoking. She has never used smokeless tobacco. She reports current alcohol use. She reports that she does not use drugs. PHYSICAL EXAM     ED TRIAGE VITALS  BP: 102/61, Temp: 98 °F (36.7 °C), Heart Rate: 89, Resp: 16, SpO2: 98 %  Physical Exam  Vitals and nursing note reviewed. Constitutional:       Appearance: She is well-developed. HENT:      Head: Normocephalic and atraumatic. Mouth/Throat:      Pharynx: No oropharyngeal exudate or posterior oropharyngeal erythema.    Eyes: Conjunctiva/sclera: Conjunctivae normal.      Pupils: Pupils are equal, round, and reactive to light. Cardiovascular:      Rate and Rhythm: Normal rate and regular rhythm. Heart sounds: Normal heart sounds. No murmur heard. No gallop. Pulmonary:      Effort: Pulmonary effort is normal. No respiratory distress. Breath sounds: Normal breath sounds. No stridor. No decreased breath sounds, wheezing, rhonchi or rales. Chest:      Chest wall: No tenderness. Musculoskeletal:         General: Normal range of motion. Right elbow: No swelling, deformity, effusion or lacerations. Normal range of motion. No tenderness. Left elbow: Normal range of motion. No tenderness. Cervical back: Normal range of motion and neck supple. Skin:     General: Skin is warm and dry. Neurological:      Mental Status: She is alert and oriented to person, place, and time. DIAGNOSTIC RESULTS   Labs:No results found for this visit on 12/14/22. IMAGING:  No orders to display     URGENT CARE COURSE:        MDM      Patient presents to urgent care with complaint of cough, congestion and sore throat that started on 12/12/2022. Patient's symptoms are consistent with a viral illness. We will treat symptoms. Patient to follow-up with [facility] for her arm pain. Patient instructed to go to ER for worsening symptoms, inability to swallow, inability to keep liquids down, inability to urinate for greater than 8 hours or difficulty breathing. Follow-up with your primary care provider. Increase oral intake. Warm salt water gargles after meals and at bedtime to help with sore throat. May take tylenol or ibuprofen as needed for pain or fever. Medications - No data to display  PROCEDURES:    Procedures    FINALIMPRESSION      1. Right arm pain    2.  Viral upper respiratory tract infection        DISPOSITION/PLAN   DISPOSITION Decision To Discharge 12/14/2022 01:36:12 PM    PATIENT REFERRED TO:  [address] [place] [address]  [phone]  Schedule an appointment as soon as possible for a visit   to establish care    [address]  [phone]    for your arm pain  DISCHARGE MEDICATIONS:  New Prescriptions    DEXTROMETHORPHAN-GUAIFENESIN (MUCINEX DM)  MG PER EXTENDED RELEASE TABLET    Take 1 tablet by mouth every 12 hours as needed for Cough or Congestion    IBUPROFEN (ADVIL;MOTRIN) 600 MG TABLET    Take 1 tablet by mouth 4 times daily as needed for Pain     Current Discharge Medication List          YVETTE Beckwith CNP, APRN - CNP  12/14/22 0643

## 2023-03-02 ENCOUNTER — HOSPITAL ENCOUNTER (EMERGENCY)
Age: 38
Discharge: HOME | End: 2023-03-02
Attending: EMERGENCY MEDICINE
Payer: COMMERCIAL

## 2023-03-02 ENCOUNTER — APPOINTMENT (OUTPATIENT)
Dept: GENERAL RADIOLOGY | Age: 38
End: 2023-03-02
Payer: COMMERCIAL

## 2023-03-02 VITALS
OXYGEN SATURATION: 99 % | TEMPERATURE: 97.6 F | HEART RATE: 76 BPM | SYSTOLIC BLOOD PRESSURE: 116 MMHG | RESPIRATION RATE: 16 BRPM | DIASTOLIC BLOOD PRESSURE: 79 MMHG

## 2023-03-02 DIAGNOSIS — R06.2 WHEEZING: ICD-10-CM

## 2023-03-02 DIAGNOSIS — J06.9 ACUTE UPPER RESPIRATORY INFECTION: Primary | ICD-10-CM

## 2023-03-02 DIAGNOSIS — M79.10 MYALGIA: ICD-10-CM

## 2023-03-02 LAB
ALBUMIN SERPL BCG-MCNC: 3.9 G/DL (ref 3.5–5.1)
ALP SERPL-CCNC: 64 U/L (ref 38–126)
ALT SERPL W/O P-5'-P-CCNC: 9 U/L (ref 11–66)
ANION GAP SERPL CALC-SCNC: 7 MEQ/L (ref 8–16)
AST SERPL-CCNC: 16 U/L (ref 5–40)
B-HCG SERPL QL: NEGATIVE
BASOPHILS ABSOLUTE: 0 THOU/MM3 (ref 0–0.1)
BASOPHILS NFR BLD AUTO: 0.1 %
BILIRUB SERPL-MCNC: 0.3 MG/DL (ref 0.3–1.2)
BUN SERPL-MCNC: 10 MG/DL (ref 7–22)
CALCIUM SERPL-MCNC: 9 MG/DL (ref 8.5–10.5)
CHLORIDE SERPL-SCNC: 110 MEQ/L (ref 98–111)
CO2 SERPL-SCNC: 23 MEQ/L (ref 23–33)
CREAT SERPL-MCNC: 0.6 MG/DL (ref 0.4–1.2)
DEPRECATED RDW RBC AUTO: 41.3 FL (ref 35–45)
EKG ATRIAL RATE: 60 BPM
EKG P AXIS: 74 DEGREES
EKG P-R INTERVAL: 180 MS
EKG Q-T INTERVAL: 414 MS
EKG QRS DURATION: 76 MS
EKG QTC CALCULATION (BAZETT): 414 MS
EKG R AXIS: 71 DEGREES
EKG T AXIS: 69 DEGREES
EKG VENTRICULAR RATE: 60 BPM
EOSINOPHIL NFR BLD AUTO: 1.8 %
EOSINOPHILS ABSOLUTE: 0.1 THOU/MM3 (ref 0–0.4)
ERYTHROCYTE [DISTWIDTH] IN BLOOD BY AUTOMATED COUNT: 12.6 % (ref 11.5–14.5)
FLUAV RNA RESP QL NAA+PROBE: NOT DETECTED
FLUBV RNA RESP QL NAA+PROBE: NOT DETECTED
GFR SERPL CREATININE-BSD FRML MDRD: > 60 ML/MIN/1.73M2
GLUCOSE SERPL-MCNC: 91 MG/DL (ref 70–108)
HCT VFR BLD AUTO: 40.6 % (ref 37–47)
HGB BLD-MCNC: 13.6 GM/DL (ref 12–16)
IMM GRANULOCYTES # BLD AUTO: 0.02 THOU/MM3 (ref 0–0.07)
IMM GRANULOCYTES NFR BLD AUTO: 0.3 %
LYMPHOCYTES ABSOLUTE: 2.5 THOU/MM3 (ref 1–4.8)
LYMPHOCYTES NFR BLD AUTO: 35.8 %
MCH RBC QN AUTO: 29.9 PG (ref 26–33)
MCHC RBC AUTO-ENTMCNC: 33.5 GM/DL (ref 32.2–35.5)
MCV RBC AUTO: 89.2 FL (ref 81–99)
MONOCYTES ABSOLUTE: 0.6 THOU/MM3 (ref 0.4–1.3)
MONOCYTES NFR BLD AUTO: 8.1 %
NEUTROPHILS NFR BLD AUTO: 53.9 %
NRBC BLD AUTO-RTO: 0 /100 WBC
OSMOLALITY SERPL CALC.SUM OF ELEC: 278 MOSMOL/KG (ref 275–300)
PLATELET # BLD AUTO: 312 THOU/MM3 (ref 130–400)
PMV BLD AUTO: 9.1 FL (ref 9.4–12.4)
POTASSIUM SERPL-SCNC: 3.8 MEQ/L (ref 3.5–5.2)
PROT SERPL-MCNC: 6.9 G/DL (ref 6.1–8)
RBC # BLD AUTO: 4.55 MILL/MM3 (ref 4.2–5.4)
SARS-COV-2 RNA RESP QL NAA+PROBE: NOT DETECTED
SEGMENTED NEUTROPHILS ABSOLUTE COUNT: 3.8 THOU/MM3 (ref 1.8–7.7)
SODIUM SERPL-SCNC: 140 MEQ/L (ref 135–145)
TROPONIN T: < 0.01 NG/ML
WBC # BLD AUTO: 7.1 THOU/MM3 (ref 4.8–10.8)

## 2023-03-02 PROCEDURE — 87636 SARSCOV2 & INF A&B AMP PRB: CPT

## 2023-03-02 PROCEDURE — 85025 COMPLETE CBC W/AUTO DIFF WBC: CPT

## 2023-03-02 PROCEDURE — 36415 COLL VENOUS BLD VENIPUNCTURE: CPT

## 2023-03-02 PROCEDURE — 99285 EMERGENCY DEPT VISIT HI MDM: CPT

## 2023-03-02 PROCEDURE — 93010 ELECTROCARDIOGRAM REPORT: CPT | Performed by: INTERNAL MEDICINE

## 2023-03-02 PROCEDURE — 71046 X-RAY EXAM CHEST 2 VIEWS: CPT

## 2023-03-02 PROCEDURE — 96372 THER/PROPH/DIAG INJ SC/IM: CPT

## 2023-03-02 PROCEDURE — 93005 ELECTROCARDIOGRAM TRACING: CPT | Performed by: PHYSICIAN ASSISTANT

## 2023-03-02 PROCEDURE — 6360000002 HC RX W HCPCS: Performed by: EMERGENCY MEDICINE

## 2023-03-02 PROCEDURE — 80053 COMPREHEN METABOLIC PANEL: CPT

## 2023-03-02 PROCEDURE — 84484 ASSAY OF TROPONIN QUANT: CPT

## 2023-03-02 PROCEDURE — 84703 CHORIONIC GONADOTROPIN ASSAY: CPT

## 2023-03-02 RX ORDER — ALBUTEROL SULFATE 90 UG/1
2 AEROSOL, METERED RESPIRATORY (INHALATION) EVERY 6 HOURS PRN
Status: DISCONTINUED | OUTPATIENT
Start: 2023-03-02 | End: 2023-03-02 | Stop reason: HOSPADM

## 2023-03-02 RX ORDER — KETOROLAC TROMETHAMINE 30 MG/ML
30 INJECTION, SOLUTION INTRAMUSCULAR; INTRAVENOUS ONCE
Status: COMPLETED | OUTPATIENT
Start: 2023-03-02 | End: 2023-03-02

## 2023-03-02 RX ADMIN — KETOROLAC TROMETHAMINE 30 MG: 30 INJECTION, SOLUTION INTRAMUSCULAR; INTRAVENOUS at 09:17

## 2023-03-02 ASSESSMENT — PAIN SCALES - GENERAL: PAINLEVEL_OUTOF10: 5

## 2023-03-02 ASSESSMENT — PAIN DESCRIPTION - DESCRIPTORS: DESCRIPTORS: ACHING

## 2023-03-02 ASSESSMENT — PAIN DESCRIPTION - LOCATION: LOCATION: BACK

## 2023-03-02 ASSESSMENT — PAIN - FUNCTIONAL ASSESSMENT: PAIN_FUNCTIONAL_ASSESSMENT: 0-10

## 2023-03-02 NOTE — LETTER
[facility] [address] [address] EMERGENCY DEPT  [address] [place] [address]  Phone: [phone]             March 2, 2023    Patient: Chares Dakin   YOB: 1985   Date of Visit: 3/2/2023       To Whom It May Concern:    Lyn Neil was seen and treated in our emergency department on 3/2/2023. She may return to work on 3/3/23.       Sincerely,             Signature:__________________________________

## 2023-03-02 NOTE — DISCHARGE INSTRUCTIONS
Return to the ED if you have any new or changing symptoms such as chest pain, shortness of breath, difficulty breathing, fevers, feeling faint, or you have any other concerns.

## 2023-03-02 NOTE — ED TRIAGE NOTES
Patient presents to ED with c/o dizziness, shortness of breath, and chronic back pain. States that she has been feeling dizzy and short of breath for the past two days. Thinks it may be because she has been outside working on [email or web address]. EKG completed. Call light in reach.

## 2023-03-02 NOTE — ED PROVIDER NOTES
[place] ENCOUNTER          Pt Name: Hui Ross  MRN: [id number]  [place] 1985  Date of evaluation: 3/2/2023  Emergency Physician: Jossie Soliz, [facility]       Chief Complaint   Patient presents with    Dizziness    Shortness of Breath     History obtained from the patient. HISTORY OF PRESENT ILLNESS    HPI  Hui Ross is a 40 y.o. female who presents to the emergency department for evaluation of URI symptoms. Patient with 3 days onset runny nose congestion, and cough. Patient states then she was working today. She is a  at the [employer]. [address]. 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 She states that the dust was bothering her and she became short of breath and lightheaded. She states she felt she could not continue working and came to the ED. Currently her shortness of breath has improved. She reports body aches worse to the right posterior shoulder. She attributes her body aches to prior lifting. It is located to the paraspinal upper thoracic area. Has been using Lidoderm patches, capsaicin cream, and Tylenol with mild improvement. The patient has no other acute complaints at this time. REVIEW OF SYSTEMS   Review of Systems   Constitutional:  Negative for chills and fever. HENT:  Positive for congestion and rhinorrhea. Negative for sore throat. Eyes:  Negative for redness and visual disturbance. Respiratory:  Positive for cough and wheezing. Negative for chest tightness and shortness of breath. Cardiovascular:  Negative for chest pain and leg swelling. Gastrointestinal:  Negative for abdominal pain, constipation, diarrhea, nausea and vomiting. Endocrine: Negative for polydipsia and polyuria. Genitourinary:  Negative for decreased urine volume, dysuria and urgency. Musculoskeletal:  Negative for back pain and myalgias.    Skin:  Negative for rash and wound. Neurological:  Positive for light-headedness. Negative for headaches. Hematological:  Does not bruise/bleed easily. Psychiatric/Behavioral:  Negative for decreased concentration and dysphoric mood. PAST MEDICAL AND SURGICAL HISTORY     Past Medical History:   Diagnosis Date    Asthma     AS A CHILD    Bronchitis     Endometriosis     Intermittent asthma without complication 1/39/2079    PID (acute pelvic inflammatory disease) 2003    Placenta previa 2007     Past Surgical History:   Procedure Laterality Date    DILATION AND CURETTAGE OF UTERUS  2001    LAPAROSCOPY N/A 10/10/2017    DIAGNOSTIC LAPAROSCOPY WITH CAUTERY OF ENDOMETRIOSIS AND LYSIS OF ADHESIONS performed by Wero Liu MD at [facility]  2012         MEDICATIONS   No current facility-administered medications for this encounter.     Current Outpatient Medications:     ibuprofen (ADVIL;MOTRIN) 600 MG tablet, Take 1 tablet by mouth 4 times daily as needed for Pain, Disp: 120 tablet, Rfl: 0    varenicline (CHANTIX STARTING MONTH PAK) 0.5 MG X 11 & 1 MG X 42 tablet, Take by mouth., Disp: 1 box, Rfl: 0    fluticasone (FLOVENT HFA) 110 MCG/ACT inhaler, Inhale 2 puffs into the lungs 2 times daily, Disp: 1 each, Rfl: 1    Spacer/Aero-Holding Chambers BETTY, 1 Device by Does not apply route daily as needed (inhaled corticosteroid usage), Disp: 1 each, Rfl: 0    albuterol (PROVENTIL) 2.5 MG/0.5ML NEBU nebulizer solution, Take 0.5 mLs by nebulization every 6 hours as needed for Wheezing, Disp: 60 each, Rfl: 1    albuterol sulfate  (90 Base) MCG/ACT inhaler, Inhale 2 puffs into the lungs every 6 hours as needed for Wheezing or Shortness of Breath, Disp: 18 g, Rfl: 0    acetaminophen (TYLENOL) 500 MG tablet, Take 1,000 mg by mouth every 6 hours as needed for Pain, Disp: , Rfl:       SOCIAL HISTORY     Social History     Social History Narrative    ** Merged History Encounter **          Social History Tobacco Use    Smoking status: Former     Packs/day: 0.00     Years: 18.00     Pack years: 0.00     Types: Cigarettes    Smokeless tobacco: Never   Vaping Use    Vaping Use: Never used   Substance Use Topics    Alcohol use: Yes     Comment: rarely    Drug use: No         ALLERGIES     Allergies   Allergen Reactions    Pcn [Penicillins] Shortness Of Breath and Itching    Penicillins Shortness Of Breath         FAMILY HISTORY     Family History   Problem Relation Age of Onset    No Known Problems Mother     No Known Problems Father     Cancer Maternal Grandmother     Cancer Maternal Grandfather          PHYSICAL EXAM     ED Triage Vitals [03/02/23 0850]   BP Temp Temp Source Heart Rate Resp SpO2 Height Weight   115/79 97.6 °F (36.4 °C) Oral 63 17 100 % -- --         Additional Vital Signs:  Vitals:    03/02/23 1009   BP: 116/79   Pulse: 76   Resp: 16   Temp:    SpO2: 99%       Physical Exam  Vitals and nursing note reviewed. Constitutional:       General: She is not in acute distress. Appearance: She is well-developed. She is not diaphoretic. HENT:      Head: Normocephalic and atraumatic. Mouth/Throat:      Pharynx: Pharyngeal swelling present. Eyes:      General:         Right eye: No discharge. Left eye: No discharge. Conjunctiva/sclera: Conjunctivae normal.      Pupils: Pupils are equal, round, and reactive to light. Neck:      Thyroid: No thyromegaly. Vascular: No JVD. Cardiovascular:      Rate and Rhythm: Normal rate and regular rhythm. Heart sounds: Normal heart sounds. Pulmonary:      Effort: Pulmonary effort is normal. No respiratory distress. Breath sounds: Examination of the right-upper field reveals wheezing. Examination of the left-upper field reveals wheezing. Wheezing present. Abdominal:      General: Bowel sounds are normal. There is no distension. Palpations: Abdomen is soft. Tenderness: There is no abdominal tenderness. Musculoskeletal:         General: Normal range of motion. Cervical back: Normal range of motion and neck supple. Right lower leg: No tenderness. No edema. Left lower leg: No tenderness. No edema. Comments: Tenderness to parascapular area. Muscle spasm noted   Lymphadenopathy:      Cervical: No cervical adenopathy. Skin:     General: Skin is warm and dry. Capillary Refill: Capillary refill takes less than 2 seconds. Findings: No rash. Neurological:      Mental Status: She is alert and oriented to person, place, and time. She is not disoriented. GCS: GCS eye subscore is 4. GCS verbal subscore is 5. GCS motor subscore is 6. Motor: No abnormal muscle tone or seizure activity. Gait: Gait normal.      Comments: Awake and alert. Moves all extremities. Non-focal exam with steady gait. INITIAL IMPRESSION AND DIFFERENTIALS   Initial Assessment: Given the patient's above chief complaint and findings on history and physical examination, I thought it was appropriate to consider the following emergency medical conditions:URI, bronchitis, wheezing, asthma, pneumonia, COVID, flu, PE, and others. Although, some of these diagnoses are unlikely they were considered in my medical decision making. Plan: CBC, BMP, ECG, COVID, pregnancy test, influenza swabs, symptomatic treatment with albuterol, Toradol and reassess.       Chronic Conditions considered:   Patient Active Problem List   Diagnosis    Endometriosis determined by laparoscopy    Pelvic peritoneal adhesions, female    Reactive hypoglycemia    Currently attempting to quit smoking    Intermittent asthma without complication         ED RESULTS   Laboratory results:  Labs Reviewed   CBC WITH AUTO DIFFERENTIAL - Abnormal; Notable for the following components:       Result Value    MPV 9.1 (*)     All other components within normal limits   COMPREHENSIVE METABOLIC PANEL - Abnormal; Notable for the following components: ALT 9 (*)     All other components within normal limits   ANION GAP - Abnormal; Notable for the following components:    Anion Gap 7.0 (*)     All other components within normal limits   COVID-19 & INFLUENZA COMBO   TROPONIN   HCG, SERUM, QUALITATIVE   OSMOLALITY   GLOMERULAR FILTRATION RATE, ESTIMATED       Radiologic studies results:  XR CHEST (2 VW)   Final Result   1. No interval change since previous study dated 4/3/2019, no acute cardiopulmonary disease. .               **This report has been created using voice recognition software. It may contain minor errors which are inherent in voice recognition technology. **      Final report electronically signed by DR Viktoria Mars on 3/2/2023 9:37 AM          ED Medications administered this visit:   Medications   ketorolac (TORADOL) injection 30 mg (30 mg IntraMUSCular Given 3/2/23 0917)         [facility]     ED Course as of 03/03/23 0946   Fri Mar 03, 2023   0945 Troponin T: < 0.010 [DD]   0945 Preg, Serum: NEGATIVE [DD]   0945 Potassium: 3.8 [DD]   0945 Hemoglobin Quant: 13.6 [DD]   0945 WBC: 7.1 [DD]   0945 SARS-CoV-2 RNA, RT PCR: NOT DETECTED [DD]   0945 INFLUENZA A: NOT DETECTED [DD]   0945 EKG Emergency  Normal sinus rhythm. [DD]   0945 XR CHEST (2 VW)  No pneumonia. Chest x-ray clear no pneumonia. [DD]      ED Course User Index  [DD] Jan Castro, DO     Available laboratory and imaging results were independently reviewed and clinically correlated. Decision Rules/Clinical Scores utilized:   none . Code Status:  Not addressed during this ED visit    [facility] Social determinants of health considered to potentially effect treatment and/or disposition plan:  Considered and not applicable   Healthy People 2030, U.[place] Department of Health and Human Services, Office of Disease Prevention and Health Promotion.      Medical Comorbidities impacting treatment or disposition:        Past Medical History:   Diagnosis Date    Asthma     AS A CHILD    Bronchitis Endometriosis     Intermittent asthma without complication 8/27/8224    PID (acute pelvic inflammatory disease) 2003    Placenta previa 2007       Consultants: Not Applicable. Final Assessment and Plan:   54-year-old female lightheadedness, URI symptoms, wheezing  Improved with albuterol. Low suspicion of pulmonary embolus. No pneumonia on chest x-ray. Lungs are clear after albuterol doubt asthma exacerbation. Plan Home continue albuterol for wheezing with URI symptoms. Muscle relaxer/Tylenol for body aches. The diagnosis, extensive differential diagnosis, plan of care, laboratory, and imaging findings were discussed at the bedside. All questions and concerns were addressed at the time of the encounter. MEDICATION CHANGES     DISCHARGE MEDICATIONS:  Discharge Medication List as of 3/2/2023 11:06 AM               FINAL DISPOSITION     Final diagnoses:   Acute upper respiratory infection   Wheezing   Myalgia     Condition: condition: good  Dispo: Discharge to home    PATIENT REFERRED TO:  [facility]  [address]  Go on 3/9/2023  Go to appointment as scheduled at 330p. Critical Care Time   CRITICAL CARE:  None    PROCEDURES: (None if blank)  Procedures:   Medical Decision Making  Amount and/or Complexity of Data Reviewed  Labs: ordered. Risk  Prescription drug management. This transcription was electronically signed. Parts of this transcriptions may have been dictated by use of voice recognition software and electronically transcribed, and parts may have been transcribed with the assistance of an ED scribe. The transcription may contain errors not detected in proofreading.     Electronically Signed: Peter Castro DO, 03/03/23, 9:46 AM         Peter Castro DO  03/03/23 4717

## 2023-03-03 ASSESSMENT — ENCOUNTER SYMPTOMS
SHORTNESS OF BREATH: 0
CHEST TIGHTNESS: 0
CONSTIPATION: 0
COUGH: 1
EYE REDNESS: 0
RHINORRHEA: 1
VOMITING: 0
ABDOMINAL PAIN: 0
WHEEZING: 1
BACK PAIN: 0
DIARRHEA: 0
SORE THROAT: 0
NAUSEA: 0

## 2023-04-01 LAB
EKG ATRIAL RATE: 60 BPM
EKG P AXIS: 74 DEGREES
EKG P-R INTERVAL: 180 MS
EKG Q-T INTERVAL: 414 MS
EKG QRS DURATION: 76 MS
EKG QTC CALCULATION (BAZETT): 414 MS
EKG R AXIS: 71 DEGREES
EKG T AXIS: 69 DEGREES
EKG VENTRICULAR RATE: 60 BPM

## 2023-04-08 ENCOUNTER — HOSPITAL ENCOUNTER (EMERGENCY)
Age: 38
Discharge: HOME | End: 2023-04-08
Payer: COMMERCIAL

## 2023-04-08 VITALS
HEART RATE: 68 BPM | SYSTOLIC BLOOD PRESSURE: 108 MMHG | TEMPERATURE: 98 F | OXYGEN SATURATION: 100 % | RESPIRATION RATE: 20 BRPM | DIASTOLIC BLOOD PRESSURE: 60 MMHG

## 2023-04-08 DIAGNOSIS — N30.01 ACUTE CYSTITIS WITH HEMATURIA: Primary | ICD-10-CM

## 2023-04-08 LAB
BILIRUB UR STRIP.AUTO-MCNC: NEGATIVE MG/DL
CHARACTER UR: ABNORMAL
COLOR: YELLOW
GLUCOSE UR QL STRIP.AUTO: NEGATIVE MG/DL
KETONES UR QL STRIP.AUTO: ABNORMAL
NITRITE UR QL STRIP.AUTO: NEGATIVE
PH UR STRIP.AUTO: 5 [PH] (ref 5–9)
PROT UR STRIP.AUTO-MCNC: 100 MG/DL
RBC #/AREA URNS HPF: ABNORMAL /[HPF]
SP GR UR STRIP.AUTO: >= 1.03 (ref 1–1.03)
UROBILINOGEN, URINE: 0.2 EU/DL (ref 0.2–1)
WBC #/AREA URNS HPF: ABNORMAL /[HPF]

## 2023-04-08 PROCEDURE — 87086 URINE CULTURE/COLONY COUNT: CPT

## 2023-04-08 PROCEDURE — 99213 OFFICE O/P EST LOW 20 MIN: CPT | Performed by: NURSE PRACTITIONER

## 2023-04-08 PROCEDURE — 81003 URINALYSIS AUTO W/O SCOPE: CPT

## 2023-04-08 RX ORDER — NITROFURANTOIN 25; 75 MG/1; MG/1
100 CAPSULE ORAL 2 TIMES DAILY
Qty: 14 CAPSULE | Refills: 0 | Status: SHIPPED | OUTPATIENT
Start: 2023-04-08 | End: 2023-04-15

## 2023-04-08 RX ORDER — PHENAZOPYRIDINE HYDROCHLORIDE 100 MG/1
100 TABLET, FILM COATED ORAL 3 TIMES DAILY PRN
Qty: 9 TABLET | Refills: 0 | Status: SHIPPED | OUTPATIENT
Start: 2023-04-08 | End: 2023-04-11

## 2023-04-08 ASSESSMENT — ENCOUNTER SYMPTOMS
WHEEZING: 0
VOMITING: 0
COUGH: 0
DIARRHEA: 0
RHINORRHEA: 0
EYE PAIN: 0
CONSTIPATION: 0
BLOOD IN STOOL: 0
ABDOMINAL PAIN: 0
SHORTNESS OF BREATH: 0
NAUSEA: 0

## 2023-04-08 NOTE — ED PROVIDER NOTES
Medications    ACETAMINOPHEN (TYLENOL) 500 MG TABLET    Take 1,000 mg by mouth every 6 hours as needed for Pain    ALBUTEROL (PROVENTIL) 2.5 MG/0.5ML NEBU NEBULIZER SOLUTION    Take 0.5 mLs by nebulization every 6 hours as needed for Wheezing    ALBUTEROL SULFATE  (90 BASE) MCG/ACT INHALER    Inhale 2 puffs into the lungs every 6 hours as needed for Wheezing or Shortness of Breath    FLUTICASONE (FLOVENT HFA) 110 MCG/ACT INHALER    Inhale 2 puffs into the lungs 2 times daily    IBUPROFEN (ADVIL;MOTRIN) 600 MG TABLET    Take 1 tablet by mouth 4 times daily as needed for Pain    SPACER/AERO-HOLDING CHAMBERS BETTY    1 Device by Does not apply route daily as needed (inhaled corticosteroid usage)    VARENICLINE (CHANTIX STARTING MONTH PAK) 0.5 MG X 11 & 1 MG X 42 TABLET    Take by mouth. ALLERGIES     Patient is is allergic to pcn [penicillins] and penicillins. FAMILY HISTORY     Patient'sfamily history includes Cancer in her maternal grandfather and maternal grandmother; No Known Problems in her father and mother. SOCIAL HISTORY     Patient  reports that she has quit smoking. She has never used smokeless tobacco. She reports current alcohol use. She reports that she does not use drugs. PHYSICAL EXAM     ED TRIAGE VITALS  BP: 108/60, Temp: 98 °F (36.7 °C), Heart Rate: 68, Resp: 20, SpO2: 100 %  Physical Exam  Vitals and nursing note reviewed. Constitutional:       Appearance: She is not diaphoretic. HENT:      Head: Normocephalic and atraumatic. Right Ear: External ear normal.      Left Ear: External ear normal.   Eyes:      Conjunctiva/sclera: Conjunctivae normal.   Pulmonary:      Effort: Pulmonary effort is normal. No respiratory distress. Abdominal:      General: There is no distension. Palpations: There is no mass. Tenderness: There is no abdominal tenderness. There is no right CVA tenderness, left CVA tenderness, guarding or rebound. Hernia: No hernia is present.

## 2023-04-09 LAB
BACTERIA UR CULT: ABNORMAL
ORGANISM: ABNORMAL

## 2023-06-23 ENCOUNTER — HOSPITAL ENCOUNTER (EMERGENCY)
Age: 38
Discharge: HOME | End: 2023-06-23
Payer: COMMERCIAL

## 2023-06-23 VITALS
HEART RATE: 99 BPM | SYSTOLIC BLOOD PRESSURE: 108 MMHG | BODY MASS INDEX: 26.11 KG/M2 | TEMPERATURE: 97.4 F | HEIGHT: 60 IN | WEIGHT: 133 LBS | DIASTOLIC BLOOD PRESSURE: 70 MMHG | RESPIRATION RATE: 16 BRPM | OXYGEN SATURATION: 99 %

## 2023-06-23 DIAGNOSIS — J02.9 ACUTE PHARYNGITIS, UNSPECIFIED ETIOLOGY: ICD-10-CM

## 2023-06-23 DIAGNOSIS — J40 BRONCHITIS: Primary | ICD-10-CM

## 2023-06-23 LAB — S PYO AG THROAT QL: NEGATIVE

## 2023-06-23 PROCEDURE — 87651 STREP A DNA AMP PROBE: CPT

## 2023-06-23 PROCEDURE — 99213 OFFICE O/P EST LOW 20 MIN: CPT | Performed by: NURSE PRACTITIONER

## 2023-06-23 PROCEDURE — 99213 OFFICE O/P EST LOW 20 MIN: CPT

## 2023-06-23 RX ORDER — BENZONATATE 100 MG/1
100 CAPSULE ORAL 3 TIMES DAILY PRN
Qty: 20 CAPSULE | Refills: 0 | Status: SHIPPED | OUTPATIENT
Start: 2023-06-23 | End: 2023-06-30

## 2023-06-23 RX ORDER — AZITHROMYCIN 250 MG/1
TABLET, FILM COATED ORAL
Qty: 1 PACKET | Refills: 0 | Status: SHIPPED | OUTPATIENT
Start: 2023-06-23

## 2023-06-23 ASSESSMENT — ENCOUNTER SYMPTOMS
DIARRHEA: 0
RHINORRHEA: 0
SORE THROAT: 1
NAUSEA: 0
SHORTNESS OF BREATH: 0
VOMITING: 0
EYE DISCHARGE: 0
COUGH: 1
TROUBLE SWALLOWING: 0
EYE REDNESS: 0

## 2023-06-23 ASSESSMENT — PAIN DESCRIPTION - DESCRIPTORS: DESCRIPTORS: SORE

## 2023-06-23 ASSESSMENT — PAIN - FUNCTIONAL ASSESSMENT: PAIN_FUNCTIONAL_ASSESSMENT: 0-10

## 2023-06-23 ASSESSMENT — PAIN DESCRIPTION - LOCATION: LOCATION: THROAT

## 2023-06-23 ASSESSMENT — PAIN DESCRIPTION - PAIN TYPE: TYPE: ACUTE PAIN

## 2023-06-23 ASSESSMENT — PAIN SCALES - GENERAL: PAINLEVEL_OUTOF10: 3

## 2023-06-23 NOTE — DISCHARGE INSTRUCTIONS
Take prescriptions per instructions, if prescribed. Gargle with salt water 2-3 times daily and use Chloraseptic sore throat spray as directed on package. You  may use Tylenol and Motrin as directed on package. Throw away toothbrush one week after sore throat resolves. Seek emergency medical treatment for fever >101.5 for 3 days, unable to eat or urinate for 6 hours, increase in current symptoms or for new or worrisome symptoms.

## 2023-06-23 NOTE — ED PROVIDER NOTES
[facility]  Urgent Care Encounter      CHIEF COMPLAINT       Chief Complaint   Patient presents with    Pharyngitis     Headache  tired  onset 2 days        Nurses Notes reviewed and I agree except as noted in the HPI. HISTORY OF PRESENT ILLNESS   Tiffany Duong is a 40 y.o. female who presents for 2-week history of sore throat and mild cough. Patient states that she is feeling more fatigued than normal as well. She did miss work today. She denies fever, nausea, vomiting, diarrhea, chest pain, shortness of breath, body aches, or headaches. She is eating and drinking well. She has not taken anything over-the-counter for symptoms. She denies chance of pregnancy at today's visit. REVIEW OF SYSTEMS     Review of Systems   Constitutional:  Positive for fatigue. Negative for chills, diaphoresis and fever. HENT:  Positive for sore throat. Negative for congestion, ear pain, rhinorrhea and trouble swallowing. Eyes:  Negative for discharge and redness. Respiratory:  Positive for cough. Negative for shortness of breath. Cardiovascular:  Negative for chest pain. Gastrointestinal:  Negative for diarrhea, nausea and vomiting. Genitourinary:  Negative for decreased urine volume. Musculoskeletal:  Negative for neck pain and neck stiffness. Skin:  Negative for rash. Neurological:  Negative for headaches. Hematological:  Negative for adenopathy. Psychiatric/Behavioral:  Negative for sleep disturbance. PAST MEDICAL HISTORY         Diagnosis Date    Asthma     AS A CHILD    Bronchitis     Endometriosis     Intermittent asthma without complication 3/41/5314    PID (acute pelvic inflammatory disease) 2003    Placenta previa 2007       SURGICAL HISTORY     Patient  has a past surgical history that includes Dilation and curettage of uterus (2001); laparoscopy (N/A, 10/10/2017); and Tubal ligation (2012).     CURRENT MEDICATIONS       Previous Medications    ACETAMINOPHEN

## 2023-06-23 NOTE — ED NOTES
Strep swab obtained and sent to lab.  Tolerated withut complaint     Bogdan Chino LPN  42/45/49 [id number]

## 2023-08-21 ENCOUNTER — HOSPITAL ENCOUNTER (EMERGENCY)
Age: 38
Discharge: HOME | End: 2023-08-21
Payer: COMMERCIAL

## 2023-08-21 VITALS
HEART RATE: 102 BPM | WEIGHT: 132.2 LBS | TEMPERATURE: 98.8 F | BODY MASS INDEX: 25.82 KG/M2 | OXYGEN SATURATION: 100 % | DIASTOLIC BLOOD PRESSURE: 61 MMHG | SYSTOLIC BLOOD PRESSURE: 108 MMHG | RESPIRATION RATE: 16 BRPM

## 2023-08-21 DIAGNOSIS — Z20.822 LAB TEST NEGATIVE FOR COVID-19 VIRUS: ICD-10-CM

## 2023-08-21 DIAGNOSIS — B34.9 VIRAL ILLNESS: Primary | ICD-10-CM

## 2023-08-21 LAB
FLUAV AG SPEC QL: NEGATIVE
FLUBV AG SPEC QL: NEGATIVE
S PYO AG THROAT QL: NEGATIVE
SARS-COV-2 RDRP RESP QL NAA+PROBE: NOT  DETECTED

## 2023-08-21 PROCEDURE — 87651 STREP A DNA AMP PROBE: CPT

## 2023-08-21 PROCEDURE — 87804 INFLUENZA ASSAY W/OPTIC: CPT

## 2023-08-21 PROCEDURE — 99213 OFFICE O/P EST LOW 20 MIN: CPT

## 2023-08-21 PROCEDURE — 87635 SARS-COV-2 COVID-19 AMP PRB: CPT

## 2023-08-21 ASSESSMENT — PAIN DESCRIPTION - DESCRIPTORS: DESCRIPTORS: ACHING;TEARING

## 2023-08-21 ASSESSMENT — ENCOUNTER SYMPTOMS
ABDOMINAL PAIN: 0
SHORTNESS OF BREATH: 0
SINUS PRESSURE: 0
VOMITING: 0
NAUSEA: 1
DIARRHEA: 0
SORE THROAT: 0
COUGH: 0

## 2023-08-21 ASSESSMENT — PAIN - FUNCTIONAL ASSESSMENT
PAIN_FUNCTIONAL_ASSESSMENT: PREVENTS OR INTERFERES SOME ACTIVE ACTIVITIES AND ADLS
PAIN_FUNCTIONAL_ASSESSMENT: 0-10

## 2023-08-21 ASSESSMENT — PAIN DESCRIPTION - PAIN TYPE: TYPE: ACUTE PAIN

## 2023-08-21 ASSESSMENT — PAIN SCALES - GENERAL: PAINLEVEL_OUTOF10: 7

## 2023-08-21 ASSESSMENT — PAIN DESCRIPTION - LOCATION: LOCATION: GENERALIZED

## 2023-08-21 ASSESSMENT — PAIN DESCRIPTION - FREQUENCY: FREQUENCY: CONTINUOUS

## 2023-08-21 NOTE — DISCHARGE INSTRUCTIONS
mouthwash 3 times daily, may use Scope, Crest, or Listerine. COLD-EEZE  over the counter: Use as directed on package. Zinc gluconate lozenges  are used to help make cold symptoms less severe or shorter in duration. This includes sore throat, cough, sneezing, stuffy nose, and a hoarse voice. Adequate nutrition, hydration, and rest are also important to good health and recovery.

## 2023-08-21 NOTE — ED PROVIDER NOTES
[address]       Chief Complaint   Patient presents with    Chills    Nausea    Headache    Generalized Body Aches       Nurses Notes reviewed and I agree except as noted in the HPI. HISTORY OF PRESENT ILLNESS   Reece Mcdaniels is a 40 y.o. female who presents to the urgent care for evaluation. She presents for evaluation of symptoms that started last night. She feels awful and everything hurts. Denies known exposure to strep, flu, or COVID. The patient/patient representative has no other acute complaints at this time. REVIEW OF SYSTEMS     Review of Systems   Constitutional:  Positive for chills and fatigue. Negative for fever. HENT:  Negative for congestion, ear pain, sinus pressure and sore throat. Respiratory:  Negative for cough and shortness of breath. Cardiovascular:  Negative for chest pain. Gastrointestinal:  Positive for nausea. Negative for abdominal pain, diarrhea and vomiting. Musculoskeletal:  Positive for arthralgias. Skin:  Negative for rash. Allergic/Immunologic: Negative for environmental allergies and food allergies. Neurological:  Positive for headaches. Hematological:  Negative for adenopathy. PAST MEDICAL HISTORY         Diagnosis Date    Asthma     AS A CHILD    Bronchitis     Endometriosis     Intermittent asthma without complication 5/57/8469    PID (acute pelvic inflammatory disease) 2003    Placenta previa 2007       SURGICAL HISTORY     Patient  has a past surgical history that includes Dilation and curettage of uterus (2001); laparoscopy (N/A, 10/10/2017); and Tubal ligation (2012).     CURRENT MEDICATIONS       Current Discharge Medication List        CONTINUE these medications which have NOT CHANGED    Details   ibuprofen (ADVIL;MOTRIN) 600 MG tablet Take 1 tablet by mouth 4 times daily as needed for Pain  Qty: 120 tablet, Refills: 0      varenicline (CHANTIX STARTING MONTH PAK) 0.5

## (undated) DEVICE — TOWEL,OR,DSP,ST,BLUE,DLX,4/PK,20PK/CS: Brand: MEDLINE

## (undated) DEVICE — CORE TRUMPET FOR SINGLE SOLUTION BAG: Brand: CORE DYNAMICS

## (undated) DEVICE — RED RUBBER ROBINSON URETHRAL CATHETER, RADIOPAQUE, SMOOTH ROUNDED TIP, 14 FR (4.7 MM): Brand: DOVER

## (undated) DEVICE — INTENDED FOR TISSUE SEPARATION, AND OTHER PROCEDURES THAT REQUIRE A SHARP SURGICAL BLADE TO PUNCTURE OR CUT.: Brand: BARD-PARKER ® CARBON RIB-BACK BLADES

## (undated) DEVICE — CHLORAPREP 26ML ORANGE

## (undated) DEVICE — TROCAR: Brand: KII SHIELDED BLADED ACCESS SYSTEM

## (undated) DEVICE — CYSTO/BLADDER IRRIGATION SET, REGULATING CLAMP

## (undated) DEVICE — PAD,SANITARY,11 IN,MAXI,W/WINGS,N-STRL: Brand: MEDLINE

## (undated) DEVICE — GARMENT COMPR STD FOR 17IN CALF UNIF THER FLOTRN

## (undated) DEVICE — FORCEPS COAG L33CM DIA5MM JAW S STL HALO CUT STR GRSP PK

## (undated) DEVICE — MEDI-VAC YANKAUER SUCTION HANDLE W/BULBOUS TIP: Brand: CARDINAL HEALTH

## (undated) DEVICE — 1840 FOAM BLOCK NEEDLE COUNTER: Brand: DEVON

## (undated) DEVICE — BAG 3X6 DETACH NYL SPEC

## (undated) DEVICE — SURE SET SINGLE BASIN-LF: Brand: MEDLINE INDUSTRIES, INC.

## (undated) DEVICE — TROCAR: Brand: KII® SLEEVE

## (undated) DEVICE — PACK PROC LAP II AURORA

## (undated) DEVICE — STRIP SKIN CLSR W0.25XL4IN WHT SPUNBOUND FBR NYL HI ADH

## (undated) DEVICE — HYPODERMIC SAFETY NEEDLE: Brand: MAGELLAN

## (undated) DEVICE — FLEXIBLE ADHESIVE BANDAGE: Brand: CURITY

## (undated) DEVICE — GAUZE,SPONGE,8"X4",12PLY,XRAY,STRL,LF: Brand: MEDLINE

## (undated) DEVICE — MEDI-VAC NON-CONDUCTIVE SUCTION TUBING: Brand: CARDINAL HEALTH

## (undated) DEVICE — TRAY PREP DRY W/ PREM GLV 2 APPL 6 SPNG 2 UNDPD 1 OVERWRAP

## (undated) DEVICE — ANTI-FOG SOLUTION WITH FOAM PAD: Brand: DEVON

## (undated) DEVICE — TELFA NON-ADHERENT ABSORBENT DRESSING: Brand: TELFA

## (undated) DEVICE — TROCAR: Brand: KII FIOS FIRST ENTRY

## (undated) DEVICE — DRAPE,UNDERBUTTOCKS,PCH,STERILE: Brand: MEDLINE

## (undated) DEVICE — Device: Brand: THUNDERBEAT 5 MM, 35 CM, FRONT-ACTUATED GRIP

## (undated) DEVICE — SOLUTION IV 1000ML 0.9% SOD CHL PH 5 INJ USP VIAFLX PLAS

## (undated) DEVICE — SYRINGE MED 10ML LUERLOCK TIP W/O SFTY DISP

## (undated) DEVICE — GLOVE ORANGE PI 7 1/2   MSG9075

## (undated) DEVICE — SYRINGE MED 30ML STD CLR PLAS LUERLOCK TIP N CTRL DISP